# Patient Record
Sex: MALE | Race: WHITE | NOT HISPANIC OR LATINO | Employment: OTHER | URBAN - METROPOLITAN AREA
[De-identification: names, ages, dates, MRNs, and addresses within clinical notes are randomized per-mention and may not be internally consistent; named-entity substitution may affect disease eponyms.]

---

## 2017-01-12 ENCOUNTER — HOSPITAL ENCOUNTER (OUTPATIENT)
Dept: LAB | Facility: MEDICAL CENTER | Age: 37
End: 2017-01-12
Attending: NURSE PRACTITIONER
Payer: COMMERCIAL

## 2017-01-12 ENCOUNTER — TELEPHONE (OUTPATIENT)
Dept: MEDICAL GROUP | Facility: MEDICAL CENTER | Age: 37
End: 2017-01-12

## 2017-01-12 DIAGNOSIS — E55.9 VITAMIN D DEFICIENCY: ICD-10-CM

## 2017-01-12 DIAGNOSIS — Z13.220 SCREENING FOR HYPERLIPIDEMIA: ICD-10-CM

## 2017-01-12 DIAGNOSIS — R41.840 DIFFICULTY CONCENTRATING: ICD-10-CM

## 2017-01-12 DIAGNOSIS — R63.5 WEIGHT GAIN: ICD-10-CM

## 2017-01-12 LAB
25(OH)D3 SERPL-MCNC: 27 NG/ML (ref 30–100)
ALBUMIN SERPL BCP-MCNC: 4.2 G/DL (ref 3.2–4.9)
ALBUMIN/GLOB SERPL: 1.4 G/DL
ALP SERPL-CCNC: 49 U/L (ref 30–99)
ALT SERPL-CCNC: 61 U/L (ref 2–50)
ANION GAP SERPL CALC-SCNC: 8 MMOL/L (ref 0–11.9)
AST SERPL-CCNC: 48 U/L (ref 12–45)
BILIRUB SERPL-MCNC: 1 MG/DL (ref 0.1–1.5)
BUN SERPL-MCNC: 18 MG/DL (ref 8–22)
CALCIUM SERPL-MCNC: 9.2 MG/DL (ref 8.4–10.2)
CHLORIDE SERPL-SCNC: 103 MMOL/L (ref 96–112)
CHOLEST SERPL-MCNC: 175 MG/DL (ref 100–199)
CO2 SERPL-SCNC: 27 MMOL/L (ref 20–33)
CREAT SERPL-MCNC: 1.02 MG/DL (ref 0.5–1.4)
GFR SERPL CREATININE-BSD FRML MDRD: >60 ML/MIN/1.73 M 2
GLOBULIN SER CALC-MCNC: 3 G/DL (ref 1.9–3.5)
GLUCOSE SERPL-MCNC: 85 MG/DL (ref 65–99)
HDLC SERPL-MCNC: 40 MG/DL
LDLC SERPL CALC-MCNC: 127 MG/DL
POTASSIUM SERPL-SCNC: 4 MMOL/L (ref 3.6–5.5)
PROT SERPL-MCNC: 7.2 G/DL (ref 6–8.2)
SODIUM SERPL-SCNC: 138 MMOL/L (ref 135–145)
T4 FREE SERPL-MCNC: 1.09 NG/DL (ref 0.58–1.64)
TRIGL SERPL-MCNC: 42 MG/DL (ref 0–149)
TSH SERPL DL<=0.005 MIU/L-ACNC: 1.24 UIU/ML (ref 0.35–5.5)

## 2017-01-12 PROCEDURE — 84439 ASSAY OF FREE THYROXINE: CPT

## 2017-01-12 PROCEDURE — 82306 VITAMIN D 25 HYDROXY: CPT

## 2017-01-12 PROCEDURE — 80061 LIPID PANEL: CPT

## 2017-01-12 PROCEDURE — 84443 ASSAY THYROID STIM HORMONE: CPT

## 2017-01-12 PROCEDURE — 36415 COLL VENOUS BLD VENIPUNCTURE: CPT

## 2017-01-12 PROCEDURE — 80053 COMPREHEN METABOLIC PANEL: CPT

## 2017-01-12 NOTE — Clinical Note
January 12, 2017        Maik Lopez  [address] [place] [address]        Dear Maik:    After careful review of your chart, we have noted you are due for a follow up appointment.  We request you call our office at [phone] at your earliest convenience and make an appointment.     We look forward to scheduling an appointment for you, so that we may provide you with the safest and most complete medical care.        If you have any questions or concerns, please don't hesitate to call.        Sincerely,        CORINA EstevezPMAGDALENA.    Electronically Signed

## 2017-01-12 NOTE — TELEPHONE ENCOUNTER
----- Message from PANCHITO Estevez sent at 1/12/2017 10:42 AM PST -----  Please have pt set appointment to review and discuss treatment for labs.

## 2017-01-16 ENCOUNTER — TELEPHONE (OUTPATIENT)
Dept: MEDICAL GROUP | Facility: MEDICAL CENTER | Age: 37
End: 2017-01-16

## 2017-01-16 NOTE — TELEPHONE ENCOUNTER
----- Message from PANCHITO Estevez sent at 1/15/2017  3:52 PM PST -----  Please have pt set appointment to review and discuss treatment for labs.

## 2017-01-26 ENCOUNTER — OFFICE VISIT (OUTPATIENT)
Dept: MEDICAL GROUP | Facility: MEDICAL CENTER | Age: 37
End: 2017-01-26
Payer: COMMERCIAL

## 2017-01-26 VITALS
TEMPERATURE: 97.3 F | SYSTOLIC BLOOD PRESSURE: 110 MMHG | WEIGHT: 233 LBS | BODY MASS INDEX: 30.88 KG/M2 | HEIGHT: 73 IN | OXYGEN SATURATION: 96 % | HEART RATE: 90 BPM | DIASTOLIC BLOOD PRESSURE: 70 MMHG

## 2017-01-26 DIAGNOSIS — E55.9 VITAMIN D DEFICIENCY: ICD-10-CM

## 2017-01-26 DIAGNOSIS — K21.9 GASTROESOPHAGEAL REFLUX DISEASE WITHOUT ESOPHAGITIS: ICD-10-CM

## 2017-01-26 DIAGNOSIS — F32.89 OTHER DEPRESSION: ICD-10-CM

## 2017-01-26 DIAGNOSIS — F51.01 PRIMARY INSOMNIA: ICD-10-CM

## 2017-01-26 DIAGNOSIS — R79.89 ELEVATED LFTS: ICD-10-CM

## 2017-01-26 DIAGNOSIS — E66.9 OBESITY (BMI 30-39.9): ICD-10-CM

## 2017-01-26 DIAGNOSIS — B00.9 HSV INFECTION: ICD-10-CM

## 2017-01-26 PROCEDURE — 99214 OFFICE O/P EST MOD 30 MIN: CPT | Performed by: NURSE PRACTITIONER

## 2017-01-26 RX ORDER — TEMAZEPAM 15 MG/1
15 CAPSULE ORAL NIGHTLY PRN
Qty: 30 CAP | Refills: 0 | Status: SHIPPED | OUTPATIENT
Start: 2017-01-26 | End: 2017-03-15 | Stop reason: SDUPTHER

## 2017-01-26 RX ORDER — OMEPRAZOLE 20 MG/1
20 CAPSULE, DELAYED RELEASE ORAL DAILY
Qty: 30 CAP | Refills: 3 | Status: SHIPPED | OUTPATIENT
Start: 2017-01-26 | End: 2018-06-07

## 2017-01-26 RX ORDER — ACYCLOVIR 400 MG/1
400 TABLET ORAL 2 TIMES DAILY PRN
Qty: 30 TAB | Refills: 0 | Status: SHIPPED | OUTPATIENT
Start: 2017-01-26 | End: 2017-06-09 | Stop reason: SDUPTHER

## 2017-01-26 ASSESSMENT — PATIENT HEALTH QUESTIONNAIRE - PHQ9: CLINICAL INTERPRETATION OF PHQ2 SCORE: 2

## 2017-01-26 NOTE — MR AVS SNAPSHOT
"        Maik Lopez   2017 10:00 AM   Office Visit   MRN: [id number]    Department:  [facility]   Dept Phone:  [phone]    Description:  Male : 1980   Provider:  PANCHITO Estevez           Allergies as of 2017     Allergen Noted Reactions    Nkda [No Known Drug Allergy] 10/07/2010         You were diagnosed with     Other depression   [5784587]   consider med tx after lab in 1m onth if lft's are wnl    Obesity (BMI 30-39.9)   [263471]   improve low fat/chol diet and exercise.     Elevated LFTs   [601497]   recheck hfp in 1 month.  f/u for review    Gastroesophageal reflux disease without esophagitis   [321368]   restart prilosec.  check h pylori    Vitamin D deficiency   [3497961]   take 4000 units daily x 30 days then dec to 2000 units daily    HSV infection   [798382]       Primary insomnia   [371565]         Vital Signs     Blood Pressure Pulse Temperature Height Weight Body Mass Index    110/70 mmHg 90 36.3 °C (97.3 °F) 1.854 m (6' 1\") 105.688 kg (233 lb) 30.75 kg/m2    Oxygen Saturation Smoking Status                96% Never Smoker           Basic Information     Date Of Birth Sex Race Ethnicity Preferred Language    1980 Male White Non- English      Problem List              ICD-10-CM Priority Class Noted - Resolved    Snoring R06.83   Unknown - Present    Insomnia G47.00   2011 - Present    Preventative health care Z00.00   2011 - Present    ESOPHAGITIS    2011 - Present    HEMORRHOIDS    2011 - Present    Pharyngitis J02.9   2016 - Present    Obesity (BMI 30-39.9) E66.9   2017 - Present      Health Maintenance        Date Due Completion Dates    IMM INFLUENZA (1) 2016 ---    IMM DTaP/Tdap/Td Vaccine (2 - Td) 2019            Current Immunizations     Hepatitis B Vaccine Non-Recombivax (Ped/Adol) 2009    Tdap Vaccine 2009    Tuberculin Skin Test 2009  3:21 PM      Below and/or attached are the " medications your provider expects you to take. Review all of your home medications and newly ordered medications with your provider and/or pharmacist. Follow medication instructions as directed by your provider and/or pharmacist. Please keep your medication list with you and share with your provider. Update the information when medications are discontinued, doses are changed, or new medications (including over-the-counter products) are added; and carry medication information at all times in the event of emergency situations     Allergies:  NKDA - (reactions not documented)               Medications  Valid as of: January 26, 2017 - 11:18 AM    Generic Name Brand Name Tablet Size Instructions for use    Acyclovir (Tab) ZOVIRAX 400 MG Take 1 Tab by mouth 2 times a day as needed.        Omeprazole (CAPSULE DELAYED RELEASE) PRILOSEC 20 MG Take 1 Cap by mouth every day.        Temazepam (Cap) RESTORIL 15 MG Take 1 Cap by mouth at bedtime as needed for Sleep. TAKE ONE CAPSULE BY MOUTH AT BEDTIME AS NEEDED FOR SLEEP        .                 Medicines prescribed today were sent to:     [id number] - [place], [place] - [address]    [address] [place] [address]    Phone: [phone] Fax: [phone]    Open 24 Hours?: No      Medication refill instructions:       If your prescription bottle indicates you have medication refills left, it is not necessary to call your provider’s office. Please contact your pharmacy and they will refill your medication.    If your prescription bottle indicates you do not have any refills left, you may request refills at any time through one of the following ways: The online [employer] system (except Urgent Care), by calling your provider’s office, or by asking your pharmacy to contact your provider’s office with a refill request. Medication refills are processed only during regular business hours and may not be available until the next business day. Your provider may request  additional information or to have a follow-up visit with you prior to refilling your medication.   *Please Note: Medication refills are assigned a new Rx number when refilled electronically. Your pharmacy may indicate that no refills were authorized even though a new prescription for the same medication is available at the pharmacy. Please request the medicine by name with the pharmacy before contacting your provider for a refill.        Your To Do List     Future Labs/Procedures Complete By Expires    H. PYLORI, UREA BREATH TEST, ADULT  As directed 1/26/2018    HEPATIC FUNCTION PANEL  As directed 1/26/2018         [email or web address] Access Code: [email or web address]  Expires: 2/5/2017  1:51 PM    [email or web address][employer]com  A secure, online tool to manage your health information     [employer]’s EventBrowsr.com® is a secure, online tool that connects you to your personalized health information from the privacy of your home -- day or night - making it very easy for you to manage your healthcare. Once the activation process is completed, you can even access your medical information using the [email or web address].com chilo, which is available for free in the Apple Chilo store or Google Play store.     [email or web address]com provides the following levels of access (as shown below):   My Chart Features   [facility] Doctor [facility]  Urgent  Care [facility]  Primary Care  Doctor   Email your healthcare team securely and privately 24/7 X X X    Manage appointments: schedule your next appointment; view details of past/upcoming appointments X      Request prescription refills. X      View recent personal medical records, including lab and immunizations X X X X   View health record, including health history, allergies, medications X X X X   Read reports about your outpatient visits, procedures, consult and ER notes X X X X   See your discharge summary, which is a recap of your hospital and/or ER visit that includes your diagnosis, lab results, and care plan. X X       How  to register for [employer]:  1. Go to  [email or web address].  2. Click on the Sign Up Now box, which takes you to the New Member Sign Up page. You will need to provide the following information:  a. Enter your BoxCat Access Code exactly as it appears at the top of this page. (You will not need to use this code after you’ve completed the sign-up process. If you do not sign up before the expiration date, you must request a new code.)   b. Enter your date of birth.   c. Enter your home email address.   d. Click Submit, and follow the next screen’s instructions.  3. Create a BoxCat ID. This will be your BoxCat login ID and cannot be changed, so think of one that is secure and easy to remember.  4. Create a [employer] password. You can change your password at any time.  5. Enter your Password Reset Question and Answer. This can be used at a later time if you forget your password.   6. Enter your e-mail address. This allows you to receive e-mail notifications when new information is available in BoxCat.  7. Click Sign Up. You can now view your health information.    For assistance activating your BoxCat account, call [phone]

## 2017-01-26 NOTE — PROGRESS NOTES
Subjective:      Maik Lopez is a 37 y.o. male who presents with No chief complaint on file.            HPI  Seen in f/u for inablility to loose wt and depression. He has been trying to work out regularly.  He doesn't have any motivation.  Feeling depressed.  Stopped eating carbs.  Drinking water.    He has chronic insomnia.  Needs refills on restoril.  Uses only occas.  Still eating lots of fat and carbs.  Reviewed appt diet.   He is still feeling very fatigued.  Not getting better.    Also needs refill on acyclovir for occas outbreaks.   Extensively reviewed approp low fat/chol diet and exercise.  Reviewed lab with pt.  His CMP shows mildly elevated lft's.  Hasn't been drinking etoh or taking tyl  Vitamin d is 27.  Not on otc supplement.  LP shows trg and LDL IS at goal but LDL is high normal at 127.  Has a low HDL.    He is having more heartburin in the last week. Did have before and resolved with tx of prilosec.  TSH, T4, GFR is wnl.  His depression is effecting his activities.  All he does is sit on couch and watch tv.  Trying to go to the gym.   Not able to concentrate.  Has had this in the past with difficulty with concentrating.      Patient Active Problem List    Diagnosis Date Noted   • Obesity (BMI 30-39.9) 01/26/2017   • Pharyngitis 06/14/2016   • Insomnia 05/26/2011   • Preventative health care 05/26/2011   • ESOPHAGITIS 05/26/2011   • HEMORRHOIDS 05/26/2011   • Snoring      Current Outpatient Prescriptions   Medication Sig Dispense Refill   • temazepam (RESTORIL) 15 MG Cap TAKE ONE CAPSULE BY MOUTH AT BEDTIME AS NEEDED FOR SLEEP 30 Cap 0     No current facility-administered medications for this visit.     Allergies   Allergen Reactions   • Nkda [No Known Drug Allergy]            ROS    Review of Systems   Constitutional: Negative.  Negative for fever, chills, weight loss, malaise/fatigue and diaphoresis.   HENT: Negative.  Negative for hearing loss, ear pain, nosebleeds, congestion, sore throat,  "neck pain, tinnitus and ear discharge.    Respiratory: Negative.  Negative for cough, hemoptysis, sputum production, shortness of breath, wheezing and stridor.    Cardiovascular: Negative.  Negative for chest pain, palpitations, orthopnea, claudication, leg swelling and PND.   Gastrointestinal: denies nausea, vomiting, diarrhea, constipation,  melena or hematochezia.  Genitourinary: Denies dysuria, hematuria, urinary incontinence, frequency or urgency.          Objective:     /70 mmHg  Pulse 90  Temp(Src) 36.3 °C (97.3 °F)  Ht 1.854 m (6' 1\")  Wt 105.688 kg (233 lb)  BMI 30.75 kg/m2  SpO2 96%     Physical Exam      Physical Exam   Vitals reviewed.  Constitutional: oriented to person, place, and time. appears well-developed and well-nourished. No distress.   Cardiovascular: Normal rate, regular rhythm, normal heart sounds and intact distal pulses.  Exam reveals no gallop and no friction rub.  No murmur heard.  No carotid bruits.   Pulmonary/Chest: Effort normal and breath sounds normal. No stridor. No respiratory distress. no wheezes or rales. exhibits no tenderness.   Musculoskeletal: Normal range of motion. exhibits no edema. kita pedal pulses 2+.  Lymphadenopathy: no cervical or supraclavicular adenopathy.   Neurological: alert and oriented to person, place, and time. exhibits normal muscle tone. Coordination normal.   Skin: Skin is warm and dry. no diaphoresis.   Psychiatric: normal mood and affect. behavior is normal.            Assessment/Plan:     1. Other depression      consider med tx after lab in 1m onth if lft's are wnl   2. Obesity (BMI 30-39.9)      improve low fat/chol diet and exercise.    3. Elevated LFTs  HEPATIC FUNCTION PANEL    recheck hfp in 1 month.  f/u for review   4. Gastroesophageal reflux disease without esophagitis  H. PYLORI, UREA BREATH TEST, ADULT    omeprazole (PRILOSEC) 20 MG delayed-release capsule    restart prilosec.  check h pylori   5. Vitamin D deficiency      take " 4000 units daily x 30 days then dec to 2000 units daily   6. HSV infection  acyclovir (ZOVIRAX) 400 MG tablet   7. Primary insomnia  temazepam (RESTORIL) 15 MG Cap     8.  Do h  Pylori breath test today  9.  Recheck HFP in 1m onth  10.  F/u after lab in 1 month for review and sx eval and poss start med for bp

## 2017-03-16 RX ORDER — TEMAZEPAM 15 MG/1
CAPSULE ORAL
Qty: 30 CAP | Refills: 0 | Status: SHIPPED
Start: 2017-03-16 | End: 2017-06-09 | Stop reason: SDUPTHER

## 2017-06-09 DIAGNOSIS — E78.5 HYPERLIPIDEMIA LDL GOAL <100: ICD-10-CM

## 2017-06-09 DIAGNOSIS — F51.01 PRIMARY INSOMNIA: ICD-10-CM

## 2017-06-09 NOTE — Clinical Note
June 12, 2017        Maik Lopez  [address] [place] [address]        Dear Maik:    We have received a request from your pharmacy to refill your prescription(s). After careful review of your chart, we have noted you are labs and a follow up appointment.  We request you call our office at [phone] at your earliest convenience and make an appointment.     However, when patients are not followed closely by their physician, potential medication complications may go unadressed. We look forward to scheduling an appointment for you, so that we may provide you with the safest and most complete medical care.          If you have any questions or concerns, please don't hesitate to call.        Sincerely,        JERE Estevez.    Electronically Signed

## 2017-06-11 RX ORDER — ACYCLOVIR 400 MG/1
TABLET ORAL
Qty: 15 TAB | Refills: 0 | Status: SHIPPED | OUTPATIENT
Start: 2017-06-11 | End: 2018-06-07 | Stop reason: SDUPTHER

## 2017-06-12 RX ORDER — TEMAZEPAM 15 MG/1
CAPSULE ORAL
Qty: 15 CAP | Refills: 0 | Status: SHIPPED
Start: 2017-06-12 | End: 2018-06-07

## 2018-06-01 PROBLEM — F90.2 ATTENTION DEFICIT HYPERACTIVITY DISORDER (ADHD), COMBINED TYPE: Status: ACTIVE | Noted: 2018-06-01

## 2018-06-07 ENCOUNTER — TELEPHONE (OUTPATIENT)
Dept: MEDICAL GROUP | Facility: MEDICAL CENTER | Age: 38
End: 2018-06-07

## 2018-06-07 ENCOUNTER — HOSPITAL ENCOUNTER (OUTPATIENT)
Dept: RADIOLOGY | Facility: MEDICAL CENTER | Age: 38
End: 2018-06-07
Attending: NURSE PRACTITIONER
Payer: COMMERCIAL

## 2018-06-07 ENCOUNTER — HOSPITAL ENCOUNTER (OUTPATIENT)
Dept: LAB | Facility: MEDICAL CENTER | Age: 38
End: 2018-06-07
Attending: NURSE PRACTITIONER
Payer: COMMERCIAL

## 2018-06-07 ENCOUNTER — OFFICE VISIT (OUTPATIENT)
Dept: MEDICAL GROUP | Facility: MEDICAL CENTER | Age: 38
End: 2018-06-07
Payer: COMMERCIAL

## 2018-06-07 VITALS
HEART RATE: 81 BPM | BODY MASS INDEX: 30.88 KG/M2 | DIASTOLIC BLOOD PRESSURE: 80 MMHG | TEMPERATURE: 97.5 F | OXYGEN SATURATION: 94 % | SYSTOLIC BLOOD PRESSURE: 110 MMHG | WEIGHT: 233 LBS | HEIGHT: 73 IN

## 2018-06-07 DIAGNOSIS — F51.01 PRIMARY INSOMNIA: ICD-10-CM

## 2018-06-07 DIAGNOSIS — R73.9 HYPERGLYCEMIA: ICD-10-CM

## 2018-06-07 DIAGNOSIS — R53.83 OTHER FATIGUE: ICD-10-CM

## 2018-06-07 DIAGNOSIS — M25.512 ACUTE PAIN OF LEFT SHOULDER: ICD-10-CM

## 2018-06-07 DIAGNOSIS — M77.01 MEDIAL EPICONDYLITIS OF RIGHT ELBOW: ICD-10-CM

## 2018-06-07 DIAGNOSIS — Z00.00 ANNUAL PHYSICAL EXAM: ICD-10-CM

## 2018-06-07 DIAGNOSIS — Z13.220 SCREENING FOR HYPERLIPIDEMIA: ICD-10-CM

## 2018-06-07 DIAGNOSIS — B00.1 COLD SORE: ICD-10-CM

## 2018-06-07 LAB
25(OH)D3 SERPL-MCNC: 34 NG/ML (ref 30–100)
ALBUMIN SERPL BCP-MCNC: 4.1 G/DL (ref 3.2–4.9)
ALBUMIN/GLOB SERPL: 1.4 G/DL
ALP SERPL-CCNC: 63 U/L (ref 30–99)
ALT SERPL-CCNC: 32 U/L (ref 2–50)
ANION GAP SERPL CALC-SCNC: 7 MMOL/L (ref 0–11.9)
AST SERPL-CCNC: 21 U/L (ref 12–45)
BASOPHILS # BLD AUTO: 0.5 % (ref 0–1.8)
BASOPHILS # BLD: 0.03 K/UL (ref 0–0.12)
BILIRUB SERPL-MCNC: 0.6 MG/DL (ref 0.1–1.5)
BUN SERPL-MCNC: 14 MG/DL (ref 8–22)
CALCIUM SERPL-MCNC: 9.5 MG/DL (ref 8.5–10.5)
CHLORIDE SERPL-SCNC: 107 MMOL/L (ref 96–112)
CHOLEST SERPL-MCNC: 198 MG/DL (ref 100–199)
CO2 SERPL-SCNC: 27 MMOL/L (ref 20–33)
CREAT SERPL-MCNC: 0.92 MG/DL (ref 0.5–1.4)
EOSINOPHIL # BLD AUTO: 0.08 K/UL (ref 0–0.51)
EOSINOPHIL NFR BLD: 1.3 % (ref 0–6.9)
ERYTHROCYTE [DISTWIDTH] IN BLOOD BY AUTOMATED COUNT: 40.2 FL (ref 35.9–50)
FOLATE SERPL-MCNC: 12.6 NG/ML
GLOBULIN SER CALC-MCNC: 2.9 G/DL (ref 1.9–3.5)
GLUCOSE SERPL-MCNC: 104 MG/DL (ref 65–99)
HCT VFR BLD AUTO: 47.9 % (ref 42–52)
HDLC SERPL-MCNC: 41 MG/DL
HGB BLD-MCNC: 15.9 G/DL (ref 14–18)
IMM GRANULOCYTES # BLD AUTO: 0.01 K/UL (ref 0–0.11)
IMM GRANULOCYTES NFR BLD AUTO: 0.2 % (ref 0–0.9)
LDLC SERPL CALC-MCNC: 141 MG/DL
LYMPHOCYTES # BLD AUTO: 1.48 K/UL (ref 1–4.8)
LYMPHOCYTES NFR BLD: 24.9 % (ref 22–41)
MCH RBC QN AUTO: 30.1 PG (ref 27–33)
MCHC RBC AUTO-ENTMCNC: 33.2 G/DL (ref 33.7–35.3)
MCV RBC AUTO: 90.7 FL (ref 81.4–97.8)
MONOCYTES # BLD AUTO: 0.51 K/UL (ref 0–0.85)
MONOCYTES NFR BLD AUTO: 8.6 % (ref 0–13.4)
NEUTROPHILS # BLD AUTO: 3.83 K/UL (ref 1.82–7.42)
NEUTROPHILS NFR BLD: 64.5 % (ref 44–72)
NRBC # BLD AUTO: 0 K/UL
NRBC BLD-RTO: 0 /100 WBC
PLATELET # BLD AUTO: 274 K/UL (ref 164–446)
PMV BLD AUTO: 9.8 FL (ref 9–12.9)
POTASSIUM SERPL-SCNC: 4.2 MMOL/L (ref 3.6–5.5)
PROT SERPL-MCNC: 7 G/DL (ref 6–8.2)
RBC # BLD AUTO: 5.28 M/UL (ref 4.7–6.1)
SODIUM SERPL-SCNC: 141 MMOL/L (ref 135–145)
T4 FREE SERPL-MCNC: 0.69 NG/DL (ref 0.53–1.43)
THYROPEROXIDASE AB SERPL-ACNC: <0.2 IU/ML (ref 0–9)
TRIGL SERPL-MCNC: 78 MG/DL (ref 0–149)
TSH SERPL DL<=0.005 MIU/L-ACNC: 1.29 UIU/ML (ref 0.38–5.33)
VIT B12 SERPL-MCNC: 399 PG/ML (ref 211–911)
WBC # BLD AUTO: 5.9 K/UL (ref 4.8–10.8)

## 2018-06-07 PROCEDURE — 82746 ASSAY OF FOLIC ACID SERUM: CPT

## 2018-06-07 PROCEDURE — 80061 LIPID PANEL: CPT

## 2018-06-07 PROCEDURE — 84439 ASSAY OF FREE THYROXINE: CPT

## 2018-06-07 PROCEDURE — 84443 ASSAY THYROID STIM HORMONE: CPT

## 2018-06-07 PROCEDURE — 99395 PREV VISIT EST AGE 18-39: CPT | Performed by: NURSE PRACTITIONER

## 2018-06-07 PROCEDURE — 86376 MICROSOMAL ANTIBODY EACH: CPT

## 2018-06-07 PROCEDURE — 82607 VITAMIN B-12: CPT

## 2018-06-07 PROCEDURE — 36415 COLL VENOUS BLD VENIPUNCTURE: CPT

## 2018-06-07 PROCEDURE — 82306 VITAMIN D 25 HYDROXY: CPT

## 2018-06-07 PROCEDURE — 73030 X-RAY EXAM OF SHOULDER: CPT | Mod: LT

## 2018-06-07 PROCEDURE — 80053 COMPREHEN METABOLIC PANEL: CPT

## 2018-06-07 PROCEDURE — 85025 COMPLETE CBC W/AUTO DIFF WBC: CPT

## 2018-06-07 RX ORDER — DICLOFENAC SODIUM 75 MG/1
75 TABLET, DELAYED RELEASE ORAL 2 TIMES DAILY
Qty: 30 TAB | Refills: 0 | Status: SHIPPED | OUTPATIENT
Start: 2018-06-07 | End: 2020-10-21

## 2018-06-07 RX ORDER — TRAZODONE HYDROCHLORIDE 50 MG/1
50-100 TABLET ORAL NIGHTLY PRN
Qty: 45 TAB | Refills: 3 | Status: SHIPPED | OUTPATIENT
Start: 2018-06-07 | End: 2020-10-21

## 2018-06-07 RX ORDER — ACYCLOVIR 400 MG/1
400 TABLET ORAL 2 TIMES DAILY PRN
Qty: 15 TAB | Refills: 3 | Status: SHIPPED | OUTPATIENT
Start: 2018-06-07 | End: 2020-10-21 | Stop reason: SDUPTHER

## 2018-06-07 ASSESSMENT — PATIENT HEALTH QUESTIONNAIRE - PHQ9: CLINICAL INTERPRETATION OF PHQ2 SCORE: 0

## 2018-06-07 NOTE — PROGRESS NOTES
Subjective:     Maik Lopez is a 38 y.o. male who presents with PE.    HPI:   Seen in f/u for PE.  He is extremely fatigued.  He is not sleeping well.  He is taking otc d and c.  Not helping.  This has been going on for several weeks.    He will wake up suddenly with feeling anxiety.  Will feel like he swallowed something.  Will feel that he has some pressure on his throat.  A ct neck in 2011 was wnl.    He is due preventative.    He tried trazodone in past and it helped.  Took it last nite and it didn't.   He has rt medial epicondylitis.  He has a brace wearing for a month.  Not helping.  His pain started about 6 months ago.  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  That causes pain.    He is having left shoulder pain.  When he goes to the gym he has pain with barbell.  Free wts is ok.  Will have pain with pushing.     He needs refill on acyclovir.    Patient Active Problem List    Diagnosis Date Noted   • Attention deficit hyperactivity disorder (ADHD), combined type 06/01/2018   • Obesity (BMI 30-39.9) 01/26/2017   • Insomnia 05/26/2011   • Preventative health care 05/26/2011   • ESOPHAGITIS 05/26/2011   • HEMORRHOIDS 05/26/2011   • Snoring        Current medicines (including changes today)  Current Outpatient Prescriptions   Medication Sig Dispense Refill   • acyclovir (ZOVIRAX) 400 MG tablet Take 1 Tab by mouth 2 times a day as needed. 15 Tab 3   • traZODone (DESYREL) 50 MG Tab Take 1-2 Tabs by mouth at bedtime as needed for Sleep. 45 Tab 3   • diclofenac EC (VOLTAREN) 75 MG Tablet Delayed Response Take 1 Tab by mouth 2 times a day. 30 Tab 0     No current facility-administered medications for this visit.        Allergies   Allergen Reactions   • Nkda [No Known Drug Allergy]        ROS  Review of Systems   Constitutional: Negative.  Negative for fever, chills, weight loss, malaise/fatigue and diaphoresis.   HENT: Negative.  Negative for hearing loss, ear pain, nosebleeds,  "congestion, sore throat, neck pain, tinnitus and ear discharge.    Eyes: Negative.  Negative for blurred vision, double vision, photophobia, pain, discharge and redness.   Respiratory: Negative.  Negative for cough, hemoptysis, sputum production, shortness of breath, wheezing and stridor.    Cardiovascular: Negative.  Negative for chest pain, palpitations, orthopnea, claudication, leg swelling and PND.   Gastrointestinal: Negative.  Negative for heartburn, nausea, vomiting, abdominal pain, diarrhea, constipation, blood in stool and melena.   Genitourinary: Negative.  Negative for dysuria, urgency, frequency, incontinence, hematuria and flank pain.   Musculoskeletal: Negative.  Negative for myalgias, back pain and falls.   Skin: Negative.  Negative for itching and rash. followed by derm.   Neurological: Negative.  Negative for dizziness, tingling, tremors, sensory change, speech change, focal weakness, seizures, loss of consciousness, weakness and headaches.   Endo/Heme/Allergies: Negative.  Negative for environmental allergies and polydipsia. Does not bruise/bleed easily.   Psychiatric/Behavioral: Negative.  Negative for depression, suicidal ideas, hallucinations, memory loss and substance abuse. The patient is not nervous/anxious.    All other systems reviewed and are negative.           Objective:     Blood pressure 110/80, pulse 81, temperature 36.4 °C (97.5 °F), height 1.854 m (6' 1\"), weight 105.7 kg (233 lb), SpO2 94 %. Body mass index is 30.74 kg/m².    Physical Exam:  Physical Exam   Vitals reviewed.  Constitutional: oriented to person, place, and time. appears well-developed and well-nourished. No distress.   HENT: Head: Normocephalic and atraumatic. Bilateral tympanic membranes wnl w/o bulging.  Right Ear: External ear normal. Left Ear: External ear normal. Nose: Nose normal.  Mouth/Throat: Oropharynx is clear and moist. No oropharyngeal exudate. kita tm wnl. Eyes: Conjunctivae and EOM are normal. Pupils " are equal, round, and reactive to light. Right eye exhibits no discharge. Left eye exhibits no discharge. No scleral icterus.    Neck: Normal range of motion. Neck supple. No JVD present.   Cardiovascular: Normal rate, regular rhythm, normal heart sounds and intact distal pulses.  Exam reveals no gallop and no friction rub.  No murmur heard.  No carotid bruits   Pulmonary/Chest: Effort normal and breath sounds normal. No stridor. No respiratory distress. no wheezes or rales. exhibits no tenderness.   Abdominal: Soft. Bowel sounds are normal. exhibits no distension and no mass. No tenderness. no rebound and no guarding.   Musculoskeletal: pain left shoulder mild with range of motion. exhibits no edema or tenderness.  kita pedal pulses 2+.  Rt medial elbow with pain with rom adduction  Lymphadenopathy:  no cervical or supraclavicular adenopathy.   Neurological: alert and oriented to person, place, and time. has normal reflexes. displays normal reflexes. No cranial nerve deficit. exhibits normal muscle tone. Coordination normal.   Skin: Skin is warm and dry. No rash noted. no diaphoresis. No erythema. No pallor.   Psychiatric: normal mood and affect. behavior is normal.        Assessment and Plan:     The following treatment plan was discussed:    1. Annual physical exam     2. Primary insomnia  REFERRAL TO SLEEP STUDIES    normal OPO x2.  refer sleep clinic for continue issues of suddenly waking up   3. Other fatigue  CBC WITH DIFFERENTIAL    CMP14+LP    TSH+FREE T4    VITAMIN D 25-HYDROXY    THYROID PEROXIDASE  (TPO) AB    VIT B12,  FOLIC ACID    traZODone (DESYREL) 50 MG Tab    poss d/t insomnia.  check lab.  f/u 1 month for review.  try trazodone   4. Medial epicondylitis of right elbow  REFERRAL TO PHYSICAL THERAPY Reason for Therapy: Eval/Treat/Report    diclofenac EC (VOLTAREN) 75 MG Tablet Delayed Response    continue brace.  refer PT.  consider ortho referral if not improving.  try voltaren x 14 days   5. Acute  pain of left shoulder  REFERRAL TO PHYSICAL THERAPY Reason for Therapy: Eval/Treat/Report    DX-SHOULDER 2+ LEFT    diclofenac EC (VOLTAREN) 75 MG Tablet Delayed Response    xray left shoulder.  f/u with pt wiht results.  refer PT.  consider MRI if not improving. use voltaren x 14 days   6. Cold sore  acyclovir (ZOVIRAX) 400 MG tablet    refill acyclovir.  uses occas   7. Screening for hyperlipidemia  CMP14+LP         Followup: Return in about 4 weeks (around 7/5/2018).

## 2018-06-07 NOTE — TELEPHONE ENCOUNTER
Will review test results with pt at upcoming appt.  His glucosei s elevated so have him do a1c before appt

## 2018-06-12 ENCOUNTER — TELEPHONE (OUTPATIENT)
Dept: MEDICAL GROUP | Facility: MEDICAL CENTER | Age: 38
End: 2018-06-12

## 2018-06-12 NOTE — TELEPHONE ENCOUNTER
Pt is asking for you to release the labs on [email or web address]. Pt is also wondering why he is so exhausted  All the time. Pt is asking if he is supposed to do sleep study?

## 2018-06-13 NOTE — TELEPHONE ENCOUNTER
i referred him to sleep clinic.  Doesn't need home study.  Please give him referral info.  That is prob why he is exhausted.

## 2018-07-05 ENCOUNTER — OFFICE VISIT (OUTPATIENT)
Dept: MEDICAL GROUP | Facility: MEDICAL CENTER | Age: 38
End: 2018-07-05
Payer: COMMERCIAL

## 2018-07-05 VITALS
OXYGEN SATURATION: 95 % | HEART RATE: 72 BPM | HEIGHT: 73 IN | SYSTOLIC BLOOD PRESSURE: 112 MMHG | DIASTOLIC BLOOD PRESSURE: 86 MMHG | WEIGHT: 229 LBS | TEMPERATURE: 97.6 F | BODY MASS INDEX: 30.35 KG/M2

## 2018-07-05 DIAGNOSIS — F51.01 PRIMARY INSOMNIA: ICD-10-CM

## 2018-07-05 DIAGNOSIS — R73.9 HYPERGLYCEMIA: ICD-10-CM

## 2018-07-05 DIAGNOSIS — R53.82 CHRONIC FATIGUE: ICD-10-CM

## 2018-07-05 DIAGNOSIS — E78.5 HYPERLIPIDEMIA LDL GOAL <130: ICD-10-CM

## 2018-07-05 LAB
HBA1C MFR BLD: 5.5 % (ref ?–5.8)
INT CON NEG: NEGATIVE
INT CON NEG: NEGATIVE
INT CON POS: POSITIVE
INT CON POS: POSITIVE
S PYO AG THROAT QL: NORMAL

## 2018-07-05 PROCEDURE — 83036 HEMOGLOBIN GLYCOSYLATED A1C: CPT | Performed by: NURSE PRACTITIONER

## 2018-07-05 PROCEDURE — 99214 OFFICE O/P EST MOD 30 MIN: CPT | Performed by: NURSE PRACTITIONER

## 2018-07-05 PROCEDURE — 87880 STREP A ASSAY W/OPTIC: CPT | Performed by: NURSE PRACTITIONER

## 2018-07-05 RX ORDER — AMITRIPTYLINE HYDROCHLORIDE 10 MG/1
10 TABLET, FILM COATED ORAL NIGHTLY PRN
Qty: 30 TAB | Refills: 1 | Status: SHIPPED | OUTPATIENT
Start: 2018-07-05 | End: 2020-10-21

## 2018-07-05 NOTE — PROGRESS NOTES
Subjective:     Maik Lopez is a 38 y.o. male who presents with insomnia.    HPI:   Seen in f/u for insomnia.  He has been using trazodone for sleep. O nly working sometimes. grogging in am after taking.  Didn't sleep last nite even if taking trazodone.  He had 2 sleep studies in the past that were not conclusive.  Doesn't feel well.  Fatigued all the time.  No energy.  Irritable.    He has a referral to sleep clinic.  Will call today.    Reviewed lab with pt.  His GFR, TPO, FOLATE, B12, D, T4, TSH, CBC is wnl.  CMP is wnl except glucose mildly elevated at 104.  a1c in office today is 5.4.  LP shows good trg. HDL sl low at 41.  Hasn't been exercising recently d/t his lack of energy.  LDL is up from 127 to 141.  Not on healthy diet.  Goal is <130.   Started havign sore throat yesterday.  No fever, chills or sweating.  No headache, sinus pressure or ear pressure.  No nasal dg.  No SOB or wheezing.         Patient Active Problem List    Diagnosis Date Noted   • Attention deficit hyperactivity disorder (ADHD), combined type 06/01/2018   • Obesity (BMI 30-39.9) 01/26/2017   • Insomnia 05/26/2011   • Preventative health care 05/26/2011   • ESOPHAGITIS 05/26/2011   • HEMORRHOIDS 05/26/2011   • Snoring        Current medicines (including changes today)  Current Outpatient Prescriptions   Medication Sig Dispense Refill   • amitriptyline (ELAVIL) 10 MG Tab Take 1 Tab by mouth at bedtime as needed. 30 Tab 1   • acyclovir (ZOVIRAX) 400 MG tablet Take 1 Tab by mouth 2 times a day as needed. 15 Tab 3   • traZODone (DESYREL) 50 MG Tab Take 1-2 Tabs by mouth at bedtime as needed for Sleep. 45 Tab 3   • diclofenac EC (VOLTAREN) 75 MG Tablet Delayed Response Take 1 Tab by mouth 2 times a day. 30 Tab 0     No current facility-administered medications for this visit.        Allergies   Allergen Reactions   • Nkda [No Known Drug Allergy]        ROS  Constitutional: Negative. Negative for fever, chills, weight loss and  "diaphoresis.   HENT: Negative. Negative for hearing loss, ear pain, nosebleeds, congestion,  neck pain, tinnitus and ear discharge.   Respiratory: Negative. Negative for cough, hemoptysis, sputum production, shortness of breath, wheezing and stridor.   Cardiovascular: Negative. Negative for chest pain, palpitations, orthopnea, claudication, leg swelling and PND.   Gastrointestinal: Denies nausea, vomiting, diarrhea, constipation, heartburn, melena or hematochezia.  Genitourinary: Denies dysuria, hematuria, urinary incontinence, frequency or urgency.        Objective:     Blood pressure 112/86, pulse 72, temperature 36.4 °C (97.6 °F), height 1.854 m (6' 1\"), weight 103.9 kg (229 lb), SpO2 95 %. Body mass index is 30.21 kg/m².    Physical Exam:  Physical Exam   Vitals reviewed.  Constitutional: oriented to person, place, and time. appears well-developed and well-nourished. No distress.   HENT:  Head: Normocephalic and atraumatic. Right Ear: External ear normal. Left Ear: External ear normal. Nose: Nose normal. Mouth/Throat: Oropharynx is clear and moist. No oropharyngeal exudate.  kita tm wnl.  Eyes: Right eye exhibits no discharge. Left eye exhibits no discharge. No scleral icterus.  Neck: No JVD present.  Cardiovascular: Normal rate, regular rhythm, normal heart sounds and intact distal pulses.  Exam reveals no gallop and no friction rub.  No murmur heard.  No carotid bruits.   Pulmonary/Chest: Effort normal and breath sounds normal. No stridor. No respiratory distress. no wheezes or rales. exhibits no tenderness.   Musculoskeletal: Normal range of motion. exhibits no edema. kita pedal pulses 2+.  Lymphadenopathy: no cervical or supraclavicular adenopathy. tender to palp left neck but no mass or lymph node palp.    Neurological: alert and oriented to person, place, and time. exhibits normal muscle tone. Coordination normal.   Skin: Skin is warm and dry. no diaphoresis.   Psychiatric: normal mood and affect. behavior " is normal.        Assessment and Plan:     The following treatment plan was discussed:    1. Primary insomnia  amitriptyline (ELAVIL) 10 MG Tab    wean off trazodone.  try elavil.  start at 10 mg.  inc if needed.  f/u yearly and if sleep study neg.  will call for sleep appt today.     2. Chronic fatigue  amitriptyline (ELAVIL) 10 MG Tab    f/u with sleep clinic   3. Hyperglycemia      a1c is wnl.  improve healthy diet and exercise   4. Hyperlipidemia LDL goal <130      improve helathy diet and exercise.    5. BMI 30.0-30.9,adult  Patient identified as having weight management issue.  Appropriate orders and counseling given.         Followup: Return in about 1 year (around 7/5/2019).

## 2018-08-25 ENCOUNTER — HOSPITAL ENCOUNTER (OUTPATIENT)
Dept: LAB | Facility: MEDICAL CENTER | Age: 38
End: 2018-08-25
Attending: OPHTHALMOLOGY
Payer: COMMERCIAL

## 2018-08-25 LAB
ANION GAP SERPL CALC-SCNC: 6 MMOL/L (ref 0–11.9)
BASOPHILS # BLD AUTO: 0.6 % (ref 0–1.8)
BASOPHILS # BLD: 0.03 K/UL (ref 0–0.12)
BUN SERPL-MCNC: 17 MG/DL (ref 8–22)
CALCIUM SERPL-MCNC: 9.5 MG/DL (ref 8.5–10.5)
CHLORIDE SERPL-SCNC: 107 MMOL/L (ref 96–112)
CO2 SERPL-SCNC: 27 MMOL/L (ref 20–33)
CREAT SERPL-MCNC: 1.13 MG/DL (ref 0.5–1.4)
EOSINOPHIL # BLD AUTO: 0.07 K/UL (ref 0–0.51)
EOSINOPHIL NFR BLD: 1.4 % (ref 0–6.9)
ERYTHROCYTE [DISTWIDTH] IN BLOOD BY AUTOMATED COUNT: 40.7 FL (ref 35.9–50)
GLUCOSE SERPL-MCNC: 99 MG/DL (ref 65–99)
HCT VFR BLD AUTO: 47.1 % (ref 42–52)
HGB BLD-MCNC: 16.2 G/DL (ref 14–18)
IMM GRANULOCYTES # BLD AUTO: 0.01 K/UL (ref 0–0.11)
IMM GRANULOCYTES NFR BLD AUTO: 0.2 % (ref 0–0.9)
LYMPHOCYTES # BLD AUTO: 1.45 K/UL (ref 1–4.8)
LYMPHOCYTES NFR BLD: 28.3 % (ref 22–41)
MCH RBC QN AUTO: 31.5 PG (ref 27–33)
MCHC RBC AUTO-ENTMCNC: 34.4 G/DL (ref 33.7–35.3)
MCV RBC AUTO: 91.6 FL (ref 81.4–97.8)
MONOCYTES # BLD AUTO: 0.56 K/UL (ref 0–0.85)
MONOCYTES NFR BLD AUTO: 10.9 % (ref 0–13.4)
NEUTROPHILS # BLD AUTO: 3.01 K/UL (ref 1.82–7.42)
NEUTROPHILS NFR BLD: 58.6 % (ref 44–72)
NRBC # BLD AUTO: 0 K/UL
NRBC BLD-RTO: 0 /100 WBC
PLATELET # BLD AUTO: 246 K/UL (ref 164–446)
PMV BLD AUTO: 10.5 FL (ref 9–12.9)
POTASSIUM SERPL-SCNC: 4.3 MMOL/L (ref 3.6–5.5)
RBC # BLD AUTO: 5.14 M/UL (ref 4.7–6.1)
SODIUM SERPL-SCNC: 140 MMOL/L (ref 135–145)
WBC # BLD AUTO: 5.1 K/UL (ref 4.8–10.8)

## 2018-08-25 PROCEDURE — 80048 BASIC METABOLIC PNL TOTAL CA: CPT

## 2018-08-25 PROCEDURE — 85025 COMPLETE CBC W/AUTO DIFF WBC: CPT

## 2018-08-25 PROCEDURE — 36415 COLL VENOUS BLD VENIPUNCTURE: CPT

## 2018-09-26 ENCOUNTER — TELEPHONE (OUTPATIENT)
Dept: MEDICAL GROUP | Facility: MEDICAL CENTER | Age: 38
End: 2018-09-26

## 2018-09-26 NOTE — TELEPHONE ENCOUNTER
Pt called stated he is needing a letter explaining why he takes trazodone. Pt states he uses it for sleep not depression. Pt needs this for his insurance

## 2018-09-26 NOTE — LETTER
September 26, 2018        Patient: Maik Lopez   YOB: 1980   Date of Visit: 9/26/2018           To Whom It May Concern:    t is my medical opinion that Maik Lopez is receiving the medication, Trazodone, to aid in sleeping.  He has not been diagnosed with depression.  He was started on this medication on June 6, 2018.  He is stable on medication without side effects.    If you have any questions or concerns, please don't hesitate to call.        Sincerely,          JERE Stockton.  Electronically Signed    [facility]  [address]  [facility] [id number] [phone] (Phone)  [phone] (Fax)

## 2018-09-26 NOTE — TELEPHONE ENCOUNTER
i doubt that there is a med that they will not be concerned about since he is on it for sleep.  They all make you drowsy.  That is what they do.

## 2018-09-26 NOTE — TELEPHONE ENCOUNTER
Ryan Cummins can you put how long pt has been on the medication and he is also asking if there is an alternative medication that the [employer] wouldn't be concerned about.

## 2018-10-02 ENCOUNTER — TELEPHONE (OUTPATIENT)
Dept: MEDICAL GROUP | Facility: MEDICAL CENTER | Age: 38
End: 2018-10-02

## 2018-10-02 NOTE — LETTER
October 2, 2018        Patient: Maik Lopez   YOB: 1980   Date of Visit: 10/2/2018           To Whom It May Concern:    Maik Lopez is a patient followed long term in my clinic.  He has stopped trazodone on September 26, 2018.  He was on this medication for sleeping at night only.  He has not been diagnosed with sleep apnea.  An apnea link has been performed in the past that was entirely normal.    If you have any questions or concerns, please don't hesitate to call.        Sincerely,          JERE Stockton.  Electronically Signed    [facility]  [address]  [facility] [id number] [phone] (Phone)  [phone] (Fax)

## 2018-10-03 NOTE — TELEPHONE ENCOUNTER
Pt called asking if he can get a letter stating he stopped the trazodone on  9-. If pt was not diagnosed with sleep apnea that needs to be stated in the letter. And can you reiterate that pt was using the medication strictly for sleep at night time and not depression please.

## 2018-10-05 DIAGNOSIS — R53.83 OTHER FATIGUE: ICD-10-CM

## 2018-10-07 RX ORDER — TRAZODONE HYDROCHLORIDE 50 MG/1
50-100 TABLET ORAL NIGHTLY PRN
Qty: 45 TAB | OUTPATIENT
Start: 2018-10-07

## 2018-10-18 ENCOUNTER — APPOINTMENT (OUTPATIENT)
Dept: SLEEP MEDICINE | Facility: MEDICAL CENTER | Age: 38
End: 2018-10-18
Payer: COMMERCIAL

## 2020-02-03 DIAGNOSIS — B00.1 COLD SORE: ICD-10-CM

## 2020-02-03 RX ORDER — ACYCLOVIR 400 MG/1
400 TABLET ORAL 2 TIMES DAILY PRN
Qty: 15 TAB | Refills: 3 | OUTPATIENT
Start: 2020-02-03

## 2020-02-03 NOTE — LETTER
February 4, 2020        Maik Lopez  [address] [place] [address]        Dear Maik:    We have received a request from your pharmacy to refill your prescription(s). After careful review of your chart, we have noted you are due for a follow up appointment.  We request you call our office at [phone] at your earliest convenience and make an appointment.     We look forward to scheduling an appointment for you, so that we may provide you with the safest and most complete medical care.        If you have any questions or concerns, please don't hesitate to call.        Sincerely,        JERE Stockton.    Electronically Signed

## 2020-10-21 ENCOUNTER — OFFICE VISIT (OUTPATIENT)
Dept: MEDICAL GROUP | Facility: MEDICAL CENTER | Age: 40
End: 2020-10-21
Payer: COMMERCIAL

## 2020-10-21 VITALS
TEMPERATURE: 97.7 F | BODY MASS INDEX: 30.87 KG/M2 | DIASTOLIC BLOOD PRESSURE: 88 MMHG | WEIGHT: 234 LBS | HEART RATE: 84 BPM | SYSTOLIC BLOOD PRESSURE: 128 MMHG | OXYGEN SATURATION: 95 %

## 2020-10-21 DIAGNOSIS — Z13.220 SCREENING FOR HYPERLIPIDEMIA: ICD-10-CM

## 2020-10-21 DIAGNOSIS — Z13.29 SCREENING FOR THYROID DISORDER: ICD-10-CM

## 2020-10-21 DIAGNOSIS — L65.9 ALOPECIA: ICD-10-CM

## 2020-10-21 DIAGNOSIS — Z13.21 ENCOUNTER FOR VITAMIN DEFICIENCY SCREENING: ICD-10-CM

## 2020-10-21 DIAGNOSIS — R68.82 DECREASED LIBIDO: ICD-10-CM

## 2020-10-21 DIAGNOSIS — B00.1 COLD SORE: ICD-10-CM

## 2020-10-21 DIAGNOSIS — R53.83 FATIGUE, UNSPECIFIED TYPE: ICD-10-CM

## 2020-10-21 DIAGNOSIS — Z23 NEED FOR TDAP VACCINATION: ICD-10-CM

## 2020-10-21 DIAGNOSIS — Z13.89 SCREENING FOR MULTIPLE CONDITIONS: ICD-10-CM

## 2020-10-21 PROCEDURE — 90471 IMMUNIZATION ADMIN: CPT | Performed by: NURSE PRACTITIONER

## 2020-10-21 PROCEDURE — 90715 TDAP VACCINE 7 YRS/> IM: CPT | Performed by: NURSE PRACTITIONER

## 2020-10-21 PROCEDURE — 99214 OFFICE O/P EST MOD 30 MIN: CPT | Mod: 25 | Performed by: NURSE PRACTITIONER

## 2020-10-21 RX ORDER — ACYCLOVIR 400 MG/1
400 TABLET ORAL 2 TIMES DAILY PRN
Qty: 15 TAB | Refills: 3 | Status: SHIPPED | OUTPATIENT
Start: 2020-10-21 | End: 2021-05-17 | Stop reason: SDUPTHER

## 2020-10-21 ASSESSMENT — PATIENT HEALTH QUESTIONNAIRE - PHQ9: CLINICAL INTERPRETATION OF PHQ2 SCORE: 0

## 2020-10-21 NOTE — PROGRESS NOTES
Subjective:     Maik Lopez is a 40 y.o. male who presents with olecraneon bursitis.    HPI:   Seen in f/u for olecraneon bursitis.  Just started 1 wk ago.  Not painful.  Tender with pressure.  No reddness.  No limitation in ROS.  No fever, chills or sweating.  He is due updated lab.    He is due Tdap.   He is having hair loss.  Hair is getting thinner.   He is also fatigued all the time with decreased libido.  He is using energy drinks for his fatigue.  He is still having difficulty sleeping.  He is having dreams that he swallowed something but he will wake up and will be fine.  Sleep apnea x2 was wnl.  He has poor sleep quality long term  He is on acyclovir for HSV infection.  Uses only prn.  Needs refills.      Patient Active Problem List    Diagnosis Date Noted   • Attention deficit hyperactivity disorder (ADHD), combined type 06/01/2018   • Obesity (BMI 30-39.9) 01/26/2017   • Insomnia 05/26/2011   • Preventative health care 05/26/2011   • ESOPHAGITIS 05/26/2011   • HEMORRHOIDS 05/26/2011   • Snoring        Current medicines (including changes today)  Current Outpatient Medications   Medication Sig Dispense Refill   • acyclovir (ZOVIRAX) 400 MG tablet Take 1 Tab by mouth 2 times a day as needed. 15 Tab 3     No current facility-administered medications for this visit.        Allergies   Allergen Reactions   • Nkda [No Known Drug Allergy]        ROS  Constitutional: Negative. Negative for fever, chills, weight loss, diaphoresis.   HENT: Negative. Negative for hearing loss, ear pain, nosebleeds, congestion, sore throat, neck pain, tinnitus and ear discharge.   Respiratory: Negative. Negative for cough, hemoptysis, sputum production, shortness of breath, wheezing and stridor.   Cardiovascular: Negative. Negative for chest pain, palpitations, orthopnea, claudication, leg swelling and PND.   Gastrointestinal: Denies nausea, vomiting, diarrhea, constipation, heartburn, melena or hematochezia.  Genitourinary:  Denies dysuria, hematuria, urinary incontinence, frequency or urgency.        Objective:     /88 (BP Location: Right arm, Patient Position: Sitting, BP Cuff Size: Adult)   Pulse 84   Temp 36.5 °C (97.7 °F) (Temporal)   Wt 106.1 kg (234 lb)   SpO2 95%  Body mass index is 30.87 kg/m².    Physical Exam:  Vitals reviewed.  Constitutional: Oriented to person, place, and time. appears well-developed and well-nourished. No distress.   Cardiovascular: Normal rate, regular rhythm, normal heart sounds and intact distal pulses. Exam reveals no gallop and no friction rub. No murmur heard. No carotid bruits.   Pulmonary/Chest: Effort normal and breath sounds normal. No stridor. No respiratory distress. no wheezes or rales. exhibits no tenderness.   Musculoskeletal: Normal range of motion. exhibits rounded swollen edematous area on rt elbow. kita pedal pulses 2+.  Lymphadenopathy: No cervical or supraclavicular adenopathy.   Neurological: Alert and oriented to person, place, and time. exhibits normal muscle tone.  Skin: Skin is warm and dry. No diaphoresis.   Psychiatric: Normal mood and affect. Behavior is normal.      Assessment and Plan:     The following treatment plan was discussed:    1. Cold sore  acyclovir (ZOVIRAX) 400 MG tablet    refill acyclovir.  uses occas   2. Decreased libido  TESTOSTERONE F&T MALE ADULT    Comp Metabolic Panel    TSH    FREE THYROXINE    check lab with testosterone, CMP, LP, D, TSH, T4.  F/U for lab review   3. Alopecia  TESTOSTERONE F&T MALE ADULT    Comp Metabolic Panel    has been loosing hair.  will chk thyroid lab.  f/u for review   4. Fatigue, unspecified type  TESTOSTERONE F&T MALE ADULT    Comp Metabolic Panel    TSH    FREE THYROXINE    VITAMIN D,25 HYDROXY    using energy drinks multiple times daily.  advised pt to stop.  check lab for poss etiologies.  f/u for review   5. Screening for thyroid disorder  TSH    FREE THYROXINE   6. Screening for hyperlipidemia  Lipid Profile    7. Need for Tdap vaccination  Tdap =>8yo IM   8. Encounter for vitamin deficiency screening  VITAMIN D,25 HYDROXY   9. Screening for multiple conditions  Comp Metabolic Panel         Followup: Return in about 4 weeks (around 11/18/2020).

## 2020-10-30 ENCOUNTER — HOSPITAL ENCOUNTER (OUTPATIENT)
Dept: LAB | Facility: MEDICAL CENTER | Age: 40
End: 2020-10-30
Attending: NURSE PRACTITIONER
Payer: COMMERCIAL

## 2020-10-30 DIAGNOSIS — Z13.21 ENCOUNTER FOR VITAMIN DEFICIENCY SCREENING: ICD-10-CM

## 2020-10-30 DIAGNOSIS — L65.9 ALOPECIA: ICD-10-CM

## 2020-10-30 DIAGNOSIS — R53.83 FATIGUE, UNSPECIFIED TYPE: ICD-10-CM

## 2020-10-30 DIAGNOSIS — Z13.89 SCREENING FOR MULTIPLE CONDITIONS: ICD-10-CM

## 2020-10-30 DIAGNOSIS — R68.82 DECREASED LIBIDO: ICD-10-CM

## 2020-10-30 DIAGNOSIS — Z13.220 SCREENING FOR HYPERLIPIDEMIA: ICD-10-CM

## 2020-10-30 DIAGNOSIS — Z13.29 SCREENING FOR THYROID DISORDER: ICD-10-CM

## 2020-10-30 LAB
ALBUMIN SERPL BCP-MCNC: 4.3 G/DL (ref 3.2–4.9)
ALBUMIN/GLOB SERPL: 1.5 G/DL
ALP SERPL-CCNC: 80 U/L (ref 30–99)
ALT SERPL-CCNC: 32 U/L (ref 2–50)
ANION GAP SERPL CALC-SCNC: 11 MMOL/L (ref 7–16)
AST SERPL-CCNC: 22 U/L (ref 12–45)
BILIRUB SERPL-MCNC: 0.6 MG/DL (ref 0.1–1.5)
BUN SERPL-MCNC: 15 MG/DL (ref 8–22)
CALCIUM SERPL-MCNC: 9.6 MG/DL (ref 8.4–10.2)
CHLORIDE SERPL-SCNC: 103 MMOL/L (ref 96–112)
CHOLEST SERPL-MCNC: 205 MG/DL (ref 100–199)
CO2 SERPL-SCNC: 25 MMOL/L (ref 20–33)
CREAT SERPL-MCNC: 0.89 MG/DL (ref 0.5–1.4)
FASTING STATUS PATIENT QL REPORTED: NORMAL
GLOBULIN SER CALC-MCNC: 2.9 G/DL (ref 1.9–3.5)
GLUCOSE SERPL-MCNC: 90 MG/DL (ref 65–99)
HDLC SERPL-MCNC: 44 MG/DL
LDLC SERPL CALC-MCNC: 146 MG/DL
POTASSIUM SERPL-SCNC: 4.3 MMOL/L (ref 3.6–5.5)
PROT SERPL-MCNC: 7.2 G/DL (ref 6–8.2)
SODIUM SERPL-SCNC: 139 MMOL/L (ref 135–145)
T4 FREE SERPL-MCNC: 1.08 NG/DL (ref 0.93–1.7)
TRIGL SERPL-MCNC: 77 MG/DL (ref 0–149)
TSH SERPL DL<=0.005 MIU/L-ACNC: 1.35 UIU/ML (ref 0.38–5.33)

## 2020-10-30 PROCEDURE — 82306 VITAMIN D 25 HYDROXY: CPT

## 2020-10-30 PROCEDURE — 84403 ASSAY OF TOTAL TESTOSTERONE: CPT

## 2020-10-30 PROCEDURE — 84439 ASSAY OF FREE THYROXINE: CPT

## 2020-10-30 PROCEDURE — 80053 COMPREHEN METABOLIC PANEL: CPT

## 2020-10-30 PROCEDURE — 84270 ASSAY OF SEX HORMONE GLOBUL: CPT

## 2020-10-30 PROCEDURE — 84402 ASSAY OF FREE TESTOSTERONE: CPT

## 2020-10-30 PROCEDURE — 80061 LIPID PANEL: CPT

## 2020-10-30 PROCEDURE — 84443 ASSAY THYROID STIM HORMONE: CPT

## 2020-10-30 PROCEDURE — 36415 COLL VENOUS BLD VENIPUNCTURE: CPT

## 2020-10-31 LAB — 25(OH)D3 SERPL-MCNC: 54 NG/ML (ref 30–100)

## 2020-11-01 LAB
SHBG SERPL-SCNC: 49 NMOL/L (ref 11–80)
TESTOST FREE MFR SERPL: 1.5 % (ref 1.6–2.9)
TESTOST FREE SERPL-MCNC: 79 PG/ML (ref 47–244)
TESTOST SERPL-MCNC: 524 NG/DL (ref 300–890)

## 2020-11-02 ENCOUNTER — TELEPHONE (OUTPATIENT)
Dept: MEDICAL GROUP | Facility: MEDICAL CENTER | Age: 40
End: 2020-11-02

## 2020-11-02 NOTE — TELEPHONE ENCOUNTER
----- Message from PANCHITO Stockton sent at 11/1/2020 12:48 PM PST -----  Please have pt set appointment to review and discuss treatment for labs.

## 2020-11-02 NOTE — LETTER
November 2, 2020        Maik Lopez  [address] [place] [address]        Dear Maik:    After careful review of your chart, we have noted you are due for a follow up appointment.  We request you call our office at [phone] at your earliest convenience and make an appointment.     We look forward to scheduling an appointment for you, so that we may provide you with the safest and most complete medical care.        If you have any questions or concerns, please don't hesitate to call.        Sincerely,        JERE Stockton.    Electronically Signed

## 2020-11-04 ENCOUNTER — OFFICE VISIT (OUTPATIENT)
Dept: MEDICAL GROUP | Facility: MEDICAL CENTER | Age: 40
End: 2020-11-04
Payer: COMMERCIAL

## 2020-11-04 VITALS
BODY MASS INDEX: 29.65 KG/M2 | HEIGHT: 74 IN | WEIGHT: 231 LBS | DIASTOLIC BLOOD PRESSURE: 82 MMHG | OXYGEN SATURATION: 96 % | HEART RATE: 72 BPM | SYSTOLIC BLOOD PRESSURE: 128 MMHG | TEMPERATURE: 97.7 F

## 2020-11-04 DIAGNOSIS — M94.0 COSTOCHONDRITIS: ICD-10-CM

## 2020-11-04 DIAGNOSIS — M70.21 OLECRANON BURSITIS OF RIGHT ELBOW: ICD-10-CM

## 2020-11-04 DIAGNOSIS — E78.00 ELEVATED LDL CHOLESTEROL LEVEL: ICD-10-CM

## 2020-11-04 PROCEDURE — 99214 OFFICE O/P EST MOD 30 MIN: CPT | Performed by: NURSE PRACTITIONER

## 2020-11-05 NOTE — PROGRESS NOTES
Subjective:     Maik Lopez is a 40 y.o. male who presents with elbow pain.    HPI:   Seen in f/u for hyperlipidemia.  He is not on a healthy diet.  Not exercising regularly.    Reviewed lab with pt.  His GFR, D, T4, TSH, CMP, testosterone is wnl.    LP shows good trg. HDL mildly dec at 44 but up from 41 last time.  His LDL is not at goal.  He is not on meds.  He is not on healthy diet.  Not exercising.   He was last seen for rt olecranon bursitis.  The elbow swelling is getting smaller.  No pain.  No reddness or sx of infection.  He is having chest wall anterior pain recently. It is reproducible.  No hx of injury.  Not taking anything for it.  It is a pulling sensation shahbaz in rt chest with lying down.         Patient Active Problem List    Diagnosis Date Noted   • Attention deficit hyperactivity disorder (ADHD), combined type 06/01/2018   • Obesity (BMI 30-39.9) 01/26/2017   • Insomnia 05/26/2011   • Preventative health care 05/26/2011   • ESOPHAGITIS 05/26/2011   • HEMORRHOIDS 05/26/2011   • Snoring        Current medicines (including changes today)  Current Outpatient Medications   Medication Sig Dispense Refill   • acyclovir (ZOVIRAX) 400 MG tablet Take 1 Tab by mouth 2 times a day as needed. 15 Tab 3     No current facility-administered medications for this visit.        Allergies   Allergen Reactions   • Nkda [No Known Drug Allergy]        ROS  Constitutional: Negative. Negative for fever, chills, weight loss, malaise/fatigue and diaphoresis.   HENT: Negative. Negative for hearing loss, ear pain, nosebleeds, congestion, sore throat, neck pain, tinnitus and ear discharge.   Respiratory: Negative. Negative for cough, hemoptysis, sputum production, shortness of breath, wheezing and stridor.   Cardiovascular: Negative. Negative for chest pain, palpitations, orthopnea, claudication, leg swelling and PND.   Gastrointestinal: Denies nausea, vomiting, diarrhea, constipation, heartburn, melena or  "hematochezia.  Genitourinary: Denies dysuria, hematuria, urinary incontinence, frequency or urgency.        Objective:     /82 (BP Location: Right arm, Patient Position: Sitting, BP Cuff Size: Adult)   Pulse 72   Temp 36.5 °C (97.7 °F) (Temporal)   Ht 1.867 m (6' 1.5\")   Wt 104.8 kg (231 lb)   SpO2 96%  Body mass index is 30.06 kg/m².    Physical Exam:  Vitals reviewed.  Constitutional: Oriented to person, place, and time. appears well-developed and well-nourished. No distress.   Cardiovascular: Normal rate, regular rhythm, normal heart sounds and intact distal pulses. Exam reveals no gallop and no friction rub. No murmur heard. No carotid bruits.   Pulmonary/Chest: Effort normal and breath sounds normal. No stridor. No respiratory distress. no wheezes or rales. exhibits no tenderness.   Musculoskeletal: Normal range of motion. exhibits no edema. kita pedal pulses 2+.  Lymphadenopathy: No cervical or supraclavicular adenopathy.   Neurological: Alert and oriented to person, place, and time. exhibits normal muscle tone.  Skin: Skin is warm and dry. No diaphoresis.   Psychiatric: Normal mood and affect. Behavior is normal.      Assessment and Plan:     The following treatment plan was discussed:    1. Elevated LDL cholesterol level  LIPID PANEL    recheck lab in 6m, f/u for review; start healthy diet and exercise; may consider putting on meds if not improved   2. Olecranon bursitis of right elbow      ibuprofen and ice prn; rest; f/u if worse and s/s of infection   3. Costochondritis      right chest with laying down; pulling sensation, ibuprofen prn; call if worse         Followup: Return in 6 months (on 5/4/2021), or if symptoms worsen or fail to improve.  "

## 2020-11-05 NOTE — NON-PROVIDER
Subjective:     Maik Lopez is a 40 y.o. male who presents with persistent right elbow pain.     HPI:   Pt seen for f/u of persistent right elbow pain. He has been taking aspirin/ibuprofen as needed for pain and applying ice with some relief. He has full motion on that arm. Swelling slightly decreased.     Labs reviewed with pt. CMP, thyroid panel, Vit D, testosterone are wnl. LP showed an elevated LDL at 146. He does not pay particular attention with his diet; no exercise. He has not gone back to the gym since the pandemic.     He reports right sided chest discomfort when he lays down and takes a deep breath. He describes the discomfort more of a pulling sensation, not sharp. The pain resolves when he sits back up or in upright position. This has been happening the past month and has been happening consistently when he is supine. He denies any other sx including SOB. No trauma that patient can recall.    Patient Active Problem List    Diagnosis Date Noted   • Attention deficit hyperactivity disorder (ADHD), combined type 06/01/2018   • Obesity (BMI 30-39.9) 01/26/2017   • Insomnia 05/26/2011   • Preventative health care 05/26/2011   • ESOPHAGITIS 05/26/2011   • HEMORRHOIDS 05/26/2011   • Snoring        Current medicines (including changes today)  Current Outpatient Medications   Medication Sig Dispense Refill   • acyclovir (ZOVIRAX) 400 MG tablet Take 1 Tab by mouth 2 times a day as needed. 15 Tab 3     No current facility-administered medications for this visit.        Allergies   Allergen Reactions   • Nkda [No Known Drug Allergy]        ROS  Constitutional: Negative. Negative for fever, chills, weight loss, malaise/fatigue and diaphoresis.   HENT: Negative. Negative for hearing loss, ear pain, nosebleeds, congestion, sore throat, neck pain, tinnitus and ear discharge.   Respiratory: Negative. Negative for cough, hemoptysis, sputum production, shortness of breath, wheezing and stridor.   Cardiovascular:  "Negative. Negative for chest pain, palpitations, orthopnea, claudication, leg swelling and PND.   Gastrointestinal: Denies nausea, vomiting, diarrhea, constipation, heartburn, melena or hematochezia.  Genitourinary: Denies dysuria, hematuria, urinary incontinence, frequency or urgency.        Objective:     /82 (BP Location: Right arm, Patient Position: Sitting, BP Cuff Size: Adult)   Pulse 72   Temp 36.5 °C (97.7 °F) (Temporal)   Ht 1.867 m (6' 1.5\")   Wt 104.8 kg (231 lb)   SpO2 96%  Body mass index is 30.06 kg/m².    Physical Exam:  Vitals reviewed.  Constitutional: Oriented to person, place, and time. appears well-developed and well-nourished. No distress.   Cardiovascular: Normal rate, regular rhythm, normal heart sounds and intact distal pulses. Exam reveals no gallop and no friction rub. No murmur heard. No carotid bruits.   Pulmonary/Chest: Effort normal and breath sounds normal. No stridor. No respiratory distress. no wheezes or rales. exhibits no tenderness.   Musculoskeletal: Normal range of motion. exhibits no edema. kita pedal pulses 2+. Right elbow with some swelling at the joint without erythema or warmth  Lymphadenopathy: No cervical or supraclavicular adenopathy.   Neurological: Alert and oriented to person, place, and time. exhibits normal muscle tone.  Skin: Skin is warm and dry. No diaphoresis.   Psychiatric: Normal mood and affect. Behavior is normal.      Assessment and Plan:     The following treatment plan was discussed:    1. Elevated LDL cholesterol level  LIPID PANEL    recheck lab in 6m, f/u for review; start healthy diet and exercise; may consider putting on meds if not improved   2. Olecranon bursitis of right elbow      ibuprofen and ice prn; rest; f/u if worse and s/s of infection   3. Costochondritis      right chest with laying down; pulling sensation, ibuprofen prn; call if worse         Followup: Return in 6 months (on 5/4/2021), or if symptoms worsen or fail to " improve.

## 2021-04-11 ENCOUNTER — APPOINTMENT (OUTPATIENT)
Dept: RADIOLOGY | Facility: MEDICAL CENTER | Age: 41
End: 2021-04-11
Attending: EMERGENCY MEDICINE
Payer: COMMERCIAL

## 2021-04-11 ENCOUNTER — HOSPITAL ENCOUNTER (EMERGENCY)
Facility: MEDICAL CENTER | Age: 41
End: 2021-04-11
Attending: EMERGENCY MEDICINE
Payer: COMMERCIAL

## 2021-04-11 VITALS
BODY MASS INDEX: 30.09 KG/M2 | DIASTOLIC BLOOD PRESSURE: 65 MMHG | HEART RATE: 62 BPM | TEMPERATURE: 98.1 F | SYSTOLIC BLOOD PRESSURE: 116 MMHG | WEIGHT: 227 LBS | OXYGEN SATURATION: 96 % | RESPIRATION RATE: 16 BRPM | HEIGHT: 73 IN

## 2021-04-11 DIAGNOSIS — S80.12XA CONTUSION OF LEFT LOWER EXTREMITY, INITIAL ENCOUNTER: ICD-10-CM

## 2021-04-11 PROCEDURE — 73610 X-RAY EXAM OF ANKLE: CPT | Mod: LT

## 2021-04-11 PROCEDURE — 700111 HCHG RX REV CODE 636 W/ 250 OVERRIDE (IP): Performed by: EMERGENCY MEDICINE

## 2021-04-11 PROCEDURE — 99284 EMERGENCY DEPT VISIT MOD MDM: CPT

## 2021-04-11 PROCEDURE — 73552 X-RAY EXAM OF FEMUR 2/>: CPT | Mod: RT

## 2021-04-11 PROCEDURE — 96372 THER/PROPH/DIAG INJ SC/IM: CPT

## 2021-04-11 RX ORDER — KETOROLAC TROMETHAMINE 30 MG/ML
30 INJECTION, SOLUTION INTRAMUSCULAR; INTRAVENOUS ONCE
Status: COMPLETED | OUTPATIENT
Start: 2021-04-11 | End: 2021-04-11

## 2021-04-11 RX ORDER — HYDROMORPHONE HYDROCHLORIDE 1 MG/ML
1 INJECTION, SOLUTION INTRAMUSCULAR; INTRAVENOUS; SUBCUTANEOUS ONCE
Status: COMPLETED | OUTPATIENT
Start: 2021-04-11 | End: 2021-04-11

## 2021-04-11 RX ORDER — HYDROCODONE BITARTRATE AND ACETAMINOPHEN 5; 325 MG/1; MG/1
1 TABLET ORAL EVERY 4 HOURS PRN
Qty: 8 TABLET | Refills: 0 | Status: SHIPPED | OUTPATIENT
Start: 2021-04-11 | End: 2021-04-16

## 2021-04-11 RX ORDER — NAPROXEN 500 MG/1
500 TABLET ORAL 2 TIMES DAILY WITH MEALS
Qty: 20 TABLET | Refills: 0 | Status: SHIPPED | OUTPATIENT
Start: 2021-04-11 | End: 2023-07-12

## 2021-04-11 RX ORDER — ONDANSETRON 4 MG/1
4 TABLET, ORALLY DISINTEGRATING ORAL ONCE
Status: COMPLETED | OUTPATIENT
Start: 2021-04-11 | End: 2021-04-11

## 2021-04-11 RX ADMIN — HYDROMORPHONE HYDROCHLORIDE 1 MG: 1 INJECTION, SOLUTION INTRAMUSCULAR; INTRAVENOUS; SUBCUTANEOUS at 15:09

## 2021-04-11 RX ADMIN — KETOROLAC TROMETHAMINE 30 MG: 30 INJECTION, SOLUTION INTRAMUSCULAR at 15:08

## 2021-04-11 RX ADMIN — ONDANSETRON 4 MG: 4 TABLET, ORALLY DISINTEGRATING ORAL at 15:07

## 2021-04-11 ASSESSMENT — PAIN DESCRIPTION - DESCRIPTORS: DESCRIPTORS: ACHING

## 2021-04-11 NOTE — ED TRIAGE NOTES
"Chief Complaint   Patient presents with   • Leg Pain     Pt complains of bilateral leg pain, worse pain on right. Pt states he got \" pinned\" under his car door today. Denies numbness or tingling. Pt has bruising and abrasion note don right leg.   "

## 2021-04-11 NOTE — ED NOTES
Discharge instructions given and discussed. RX for norco and naproxen  given and pt educated. Pt educated to come back to ER for new or worsening symptoms and follow up with PCP as instructed. Pt verbalized understanding. Crutches given and pt demonstrated correct use. VSS. Pt  Discharged in stable condition.

## 2021-04-11 NOTE — DISCHARGE INSTRUCTIONS
Weightbearing as tolerated, drink additional fluids for the next 48 hours.  Return for any change or worsening symptoms

## 2021-04-11 NOTE — ED PROVIDER NOTES
ED Provider Note    ED Provider    Means of arrival: Hospitalist  History obtained from: Patient  History limited by: None    CHIEF COMPLAINT  Chief Complaint   Patient presents with    Leg Pain       HPI  Maik Lopez is a 41 y.o. male who presents with complaints of pain to the right anterior thigh, left anterior thigh and left ankle.  The patient was working on a car at home, he dislodged the years and the car started rolling while he was crouching.  The door of the car pinned his legs to the ground.  He has an abrasion to the right leg.  He does have pain with movement he is able to bear weight but getting up from a sitting position is extremely painful.  His pain is in bilateral anterior thighs and left ankle.  No other injuries.  No head injury no nausea no vomiting no numbness tingling or weakness of the lower extremities    REVIEW OF SYSTEMS  See HPI for further details. All other systems are negative.     PAST MEDICAL HISTORY   has a past medical history of ADD (attention deficit disorder), Esophagitis, Hemorrhoids, Insomnia, Obesity (BMI 30-39.9) (1/26/2017), and Snoring (10/10).    SOCIAL HISTORY  Social History     Tobacco Use    Smoking status: Never Smoker    Smokeless tobacco: Never Used   Substance and Sexual Activity    Alcohol use: Yes     Alcohol/week: 3.0 oz     Types: 6 Cans of beer per week     Comment: occasional    Drug use: No    Sexual activity: Not on file       SURGICAL HISTORY   has a past surgical history that includes cholecystectomy.    CURRENT MEDICATIONS  Home Medications       Reviewed by Monique Bolton (Pharmacy Tech) on 04/11/21 at 1458  Med List Status: Complete     Medication Last Dose Status   Acetaminophen (TYLENOL PO) 4/11/2021 Active   acyclovir (ZOVIRAX) 400 MG tablet Not Taking Active   Cholecalciferol (D3 VITAMIN PO) 4/10/2021 Active                    ALLERGIES  Allergies   Allergen Reactions    Nkda [No Known Drug Allergy]        PHYSICAL EXAM  VITAL  "SIGNS: /65   Pulse 62   Temp 36.7 °C (98.1 °F) (Temporal)   Resp 16   Ht 1.854 m (6' 1\")   Wt 103 kg (227 lb)   SpO2 96%   BMI 29.95 kg/m²   Constitutional: Alert in no apparent distress.  HENT: No signs of trauma, Mucous membranes are moist   Eyes:  Conjunctiva normal, Non-icteric.   Neck: Normal range of motion,, Supple,  Lymphatic: No lymphadenopathy noted.   Cardiovascular: No cyanosis of the lower extremities.   Thorax & Lungs: , No respiratory distress, respirations are even and unlabored  Abdomen: No abdominal distention  Skin: Warm, Dry,Normal color, there is an abrasion to the right thigh, grade 2.  Left thigh has grade 1 abrasion  Extremities:No edema,   Musculoskeletal: Abrasion as described above.  There is tenderness around the abrasion in the anterior thigh.  Range of motion the knee does elicit some tenderness into the thigh.  Distal neurovascular is normal.  This is on the right.    On the left there is a superficial abrasion with mild tenderness.  There is tenderness on range of motion of the lower knee.  Distal neurovascular is normal.  Neurologic: Alert ,Oriented x4, Normal motor function, Normal sensory function, No focal deficits noted.   Psychiatric: Affect normal, Judgment normal, Mood normal.       MEDICAL DECISION MAKING  This is a 41 y.o. male who presents with symptoms as described above.  There is no abrasion, x-rays were done to evaluate for fracture.  I suspect her bruising, it is a limited area do not suspect high incidence of rhabdomyolysis    DIAGNOSTIC STUDIES / PROCEDURES    EKG      LABS      RADIOLOGY  DX-ANKLE 3+ VIEWS LEFT   Final Result      No radiographic evidence of acute traumatic injury      DX-FEMUR-2+ RIGHT   Final Result      No radiographic evidence of acute traumatic injury.          COURSE  Pertinent Labs & Imaging studies reviewed. (See chart for details)    2:53 PM - Patient seen and examined at bedside. Discussed plan of care,           Patient was " medicated for pain with good results.  X-rays show no fracture.  I have abrasion and probably contusions.  I instructed the patient drink lots of fluids.    In prescribing controlled substances to this patient, I certify that I have obtained and reviewed the medical history of Maik Lopez. I have also made a good denisse effort to obtain applicable records from other providers who have treated the patient and records did not demonstrate any increased risk of substance abuse that would prevent me from prescribing controlled substances.     I have conducted a physical exam and documented it. I have reviewed Mr. Lopez’s prescription history as maintained by the [place] Prescription Monitoring Program.     I have assessed the patient’s risk for abuse, dependency, and addiction using the validated Opioid Risk Tool available at https://www.mdcalc.com/ptuhtd-enlq-fvux-ort-narcotic-abuse.     Given the above, I believe the benefits of controlled substance therapy outweigh the risks. The reasons for prescribing controlled substances include non-narcotic, oral analgesic alternatives have been inadequate for pain control. Accordingly, I have discussed the risk and benefits, treatment plan, and alternative therapies with the patient.   .     Discharged home in stable condition    FINAL IMPRESSION  1. Contusion of left lower extremity, initial encounter        The note accurately reflects work and decisions made by me.  Shamar Damon D.O.  4/11/2021  9:09 PM

## 2021-05-17 ENCOUNTER — OFFICE VISIT (OUTPATIENT)
Dept: MEDICAL GROUP | Facility: MEDICAL CENTER | Age: 41
End: 2021-05-17
Payer: COMMERCIAL

## 2021-05-17 VITALS
HEIGHT: 74 IN | BODY MASS INDEX: 29.77 KG/M2 | WEIGHT: 232 LBS | SYSTOLIC BLOOD PRESSURE: 102 MMHG | TEMPERATURE: 97.7 F | OXYGEN SATURATION: 94 % | DIASTOLIC BLOOD PRESSURE: 80 MMHG | HEART RATE: 79 BPM

## 2021-05-17 DIAGNOSIS — S70.11XA THIGH HEMATOMA, RIGHT, INITIAL ENCOUNTER: ICD-10-CM

## 2021-05-17 DIAGNOSIS — B00.1 COLD SORE: ICD-10-CM

## 2021-05-17 PROCEDURE — 99214 OFFICE O/P EST MOD 30 MIN: CPT | Performed by: NURSE PRACTITIONER

## 2021-05-17 RX ORDER — ACYCLOVIR 400 MG/1
400 TABLET ORAL 2 TIMES DAILY PRN
Qty: 25 TABLET | Refills: 6 | Status: SHIPPED | OUTPATIENT
Start: 2021-05-17 | End: 2023-07-12 | Stop reason: SDUPTHER

## 2021-05-17 ASSESSMENT — PATIENT HEALTH QUESTIONNAIRE - PHQ9: CLINICAL INTERPRETATION OF PHQ2 SCORE: 0

## 2021-05-17 NOTE — PROGRESS NOTES
Subjective:     Maik Lopez is a 41 y.o. male who presents with rt leg injury.    HPI:   Seen in f/u for rt leg injury.  On 4/11/21 he was working on his car when it rolled and almost trapped him underneath.  He sustained a large hematoma on rt thigh and mod on left.  The left leg is almost back to normal.  He still has a large bulge on anterior rt thigh. Mild bruising still.  Mild pain.  On limitation in strength or ROM.  xrays done in ER were wnl of rt femur and left ankle.  Thigh sx are slowly resolving.  He has had more HSV outbreaks with the injury.  Needs his acyclovir refilled.  Taking med prn.    Patient Active Problem List    Diagnosis Date Noted   • Attention deficit hyperactivity disorder (ADHD), combined type 06/01/2018   • Obesity (BMI 30-39.9) 01/26/2017   • Insomnia 05/26/2011   • Preventative health care 05/26/2011   • ESOPHAGITIS 05/26/2011   • HEMORRHOIDS 05/26/2011   • Snoring        Current medicines (including changes today)  Current Outpatient Medications   Medication Sig Dispense Refill   • acyclovir (ZOVIRAX) 400 MG tablet Take 1 tablet by mouth 2 times a day as needed. 25 tablet 6   • Cholecalciferol (D3 VITAMIN PO) Take 1 capsule by mouth every day.     • Acetaminophen (TYLENOL PO) Take 2 Tablets by mouth as needed (For pain). Pt is not sure the strength     • naproxen (NAPROSYN) 500 MG Tab Take 1 tablet by mouth 2 times a day with meals. 20 tablet 0     No current facility-administered medications for this visit.       Allergies   Allergen Reactions   • Nkda [No Known Drug Allergy]        ROS  Constitutional: Negative. Negative for fever, chills, weight loss, malaise/fatigue and diaphoresis.   HENT: Negative. Negative for hearing loss, ear pain, nosebleeds, congestion, sore throat, neck pain, tinnitus and ear discharge.   Respiratory: Negative. Negative for cough, hemoptysis, sputum production, shortness of breath, wheezing and stridor.   Cardiovascular: Negative. Negative for  "chest pain, palpitations, orthopnea, claudication, leg swelling and PND.   Gastrointestinal: Denies nausea, vomiting, diarrhea, constipation, heartburn, melena or hematochezia.  Genitourinary: Denies dysuria, hematuria, urinary incontinence, frequency or urgency.        Objective:     /80 (BP Location: Right arm, Patient Position: Sitting)   Pulse 79   Temp 36.5 °C (97.7 °F) (Temporal)   Ht 1.867 m (6' 1.5\")   Wt 105 kg (232 lb)   SpO2 94%  Body mass index is 30.19 kg/m².    Physical Exam:  Vitals reviewed.  Constitutional: Oriented to person, place, and time. appears well-developed and well-nourished. No distress.   Cardiovascular: Normal rate, regular rhythm, normal heart sounds and intact distal pulses. Exam reveals no gallop and no friction rub. No murmur heard. No carotid bruits.   Pulmonary/Chest: Effort normal and breath sounds normal. No stridor. No respiratory distress. no wheezes or rales. exhibits no tenderness.   Musculoskeletal: Normal range of motion. exhibits mod sized hematoma/bulge in anterior rt thigh.  No erythema. kita pedal pulses 2+.  Lymphadenopathy: No cervical or supraclavicular adenopathy.   Neurological: Alert and oriented to person, place, and time. exhibits normal muscle tone.  Skin: Skin is warm and dry. No diaphoresis.   Psychiatric: Normal mood and affect. Behavior is normal.      Assessment and Plan:     The following treatment plan was discussed:    1. Thigh hematoma, right, initial encounter      hematoma slowly resolving.  will email if pain/weakness noted or sx not improved.  will do MRI femur to assess for tissue damage if needed.     2. Cold sore  acyclovir (ZOVIRAX) 400 MG tablet    refill acyclovir.  uses occas         Followup: Return in about 5 months (around 10/17/2021).call for lab slip  "

## 2021-08-04 ENCOUNTER — OFFICE VISIT (OUTPATIENT)
Dept: MEDICAL GROUP | Facility: MEDICAL CENTER | Age: 41
End: 2021-08-04
Payer: COMMERCIAL

## 2021-08-04 VITALS
OXYGEN SATURATION: 95 % | DIASTOLIC BLOOD PRESSURE: 84 MMHG | WEIGHT: 237.6 LBS | HEIGHT: 74 IN | TEMPERATURE: 97.4 F | BODY MASS INDEX: 30.49 KG/M2 | SYSTOLIC BLOOD PRESSURE: 130 MMHG | HEART RATE: 90 BPM

## 2021-08-04 DIAGNOSIS — S70.11XD HEMATOMA OF RIGHT THIGH, SUBSEQUENT ENCOUNTER: ICD-10-CM

## 2021-08-04 DIAGNOSIS — R41.840 DIFFICULTY CONCENTRATING: ICD-10-CM

## 2021-08-04 DIAGNOSIS — E78.00 ELEVATED LDL CHOLESTEROL LEVEL: ICD-10-CM

## 2021-08-04 DIAGNOSIS — F51.01 PRIMARY INSOMNIA: ICD-10-CM

## 2021-08-04 DIAGNOSIS — M25.512 ACUTE PAIN OF LEFT SHOULDER: ICD-10-CM

## 2021-08-04 DIAGNOSIS — Z13.89 SCREENING FOR MULTIPLE CONDITIONS: ICD-10-CM

## 2021-08-04 PROCEDURE — 99214 OFFICE O/P EST MOD 30 MIN: CPT | Performed by: NURSE PRACTITIONER

## 2021-08-04 RX ORDER — DICLOFENAC SODIUM 75 MG/1
75 TABLET, DELAYED RELEASE ORAL 2 TIMES DAILY
Qty: 30 TABLET | Refills: 0 | Status: SHIPPED | OUTPATIENT
Start: 2021-08-04 | End: 2022-01-13

## 2021-08-04 NOTE — PROGRESS NOTES
Subjective:     Maik Lopez is a 41 y.o. male who presents with left shoulder pain.    HPI:   Seen in f/u for left shoulder pain.  No hx of injury.  He sleeps on his arms.  Pain is in the shoulder joint.  No arm numbness.  Not exercising now.  No arm numbness or weakness.  Pain with anterior raising of arm.    He would like to see someone to help him with his difficulty concentrating.  He is trying to study and it is very difficult to concentrate.  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  He has struggled with this his whole life.  He is not sleeping well.  He has a baby on the way.  He is a light sleeper.  He has tried extra strength sleep gummy melatonin.  He cannot take any antidepressants for tx d/t his  license.    He injured his rt thigh in april.  It is still swollen.  No pain.  Had a lg hematoma in the area.    Patient Active Problem List    Diagnosis Date Noted   • Attention deficit hyperactivity disorder (ADHD), combined type 06/01/2018   • Obesity (BMI 30-39.9) 01/26/2017   • Insomnia 05/26/2011   • Preventative health care 05/26/2011   • ESOPHAGITIS 05/26/2011   • HEMORRHOIDS 05/26/2011   • Snoring        Current medicines (including changes today)  Current Outpatient Medications   Medication Sig Dispense Refill   • diclofenac DR (VOLTAREN) 75 MG Tablet Delayed Response Take 1 tablet by mouth 2 times a day. 30 tablet 0   • acyclovir (ZOVIRAX) 400 MG tablet Take 1 tablet by mouth 2 times a day as needed. 25 tablet 6   • Acetaminophen (TYLENOL PO) Take 2 Tablets by mouth as needed (For pain). Pt is not sure the strength     • naproxen (NAPROSYN) 500 MG Tab Take 1 tablet by mouth 2 times a day with meals. 20 tablet 0   • Cholecalciferol (D3 VITAMIN PO) Take 1 capsule by mouth every day.       No current facility-administered medications for this visit.       Allergies   Allergen Reactions   • Nkda [No Known Drug Allergy]        ROS  Constitutional: Negative. Negative for fever,  "chills, weight loss, malaise/fatigue and diaphoresis.   HENT: Negative. Negative for hearing loss, ear pain, nosebleeds, congestion, sore throat, neck pain, tinnitus and ear discharge.   Respiratory: Negative. Negative for cough, hemoptysis, sputum production, shortness of breath, wheezing and stridor.   Cardiovascular: Negative. Negative for chest pain, palpitations, orthopnea, claudication, leg swelling and PND.   Gastrointestinal: Denies nausea, vomiting, diarrhea, constipation, heartburn, melena or hematochezia.  Genitourinary: Denies dysuria, hematuria, urinary incontinence, frequency or urgency.        Objective:     /84 (BP Location: Right arm, Patient Position: Sitting)   Pulse 90   Temp 36.3 °C (97.4 °F) (Temporal)   Ht 1.867 m (6' 1.5\")   Wt 108 kg (237 lb 9.6 oz)   SpO2 95%  Body mass index is 30.92 kg/m².    Physical Exam:  Vitals reviewed.  Constitutional: Oriented to person, place, and time. appears well-developed and well-nourished. No distress.   Cardiovascular: Normal rate, regular rhythm, normal heart sounds and intact distal pulses. Exam reveals no gallop and no friction rub. No murmur heard. No carotid bruits.   Pulmonary/Chest: Effort normal and breath sounds normal. No stridor. No respiratory distress. no wheezes or rales. exhibits no tenderness.   Musculoskeletal: pain left shoulder with range of motion. exhibits mod swelling in rt anterior thigh.  Full ROM w/o pain in thigh. kita pedal pulses 2+.  Lymphadenopathy: No cervical or supraclavicular adenopathy.   Neurological: Alert and oriented to person, place, and time. exhibits normal muscle tone.  Skin: Skin is warm and dry. No diaphoresis.   Psychiatric: Normal mood and affect. Behavior is normal.      Assessment and Plan:     The following treatment plan was discussed:    1. Acute pain of left shoulder  DX-SHOULDER 2+ LEFT    diclofenac DR (VOLTAREN) 75 MG Tablet Delayed Response    xray left shoulder.  use voltaren x 14 days.  if " not improved consider PT, MRI, refer ortho   2. Hematoma of right thigh, subsequent encounter  DX-FEMUR-2+ RIGHT    xray rt thigh.  consider ortho referral   f/u with pt w/test results.    3. Primary insomnia      unable to tx d/t  license.  will see if he can take doxalamine   4. Difficulty concentrating  REFERRAL TO PSYCHOLOGY    refer psych for evaluation.  cannot take meds d/t  license.  do lab and f/u for review.           Followup: Return in about 4 weeks (around 9/1/2021).

## 2021-09-03 ENCOUNTER — APPOINTMENT (RX ONLY)
Dept: URBAN - METROPOLITAN AREA CLINIC 35 | Facility: CLINIC | Age: 41
Setting detail: DERMATOLOGY
End: 2021-09-03

## 2021-09-03 DIAGNOSIS — L29.8 OTHER PRURITUS: ICD-10-CM

## 2021-09-03 DIAGNOSIS — D18.0 HEMANGIOMA: ICD-10-CM

## 2021-09-03 DIAGNOSIS — L29.89 OTHER PRURITUS: ICD-10-CM

## 2021-09-03 PROBLEM — D18.01 HEMANGIOMA OF SKIN AND SUBCUTANEOUS TISSUE: Status: ACTIVE | Noted: 2021-09-03

## 2021-09-03 PROCEDURE — ? COUNSELING

## 2021-09-03 PROCEDURE — ? PRESCRIPTION

## 2021-09-03 PROCEDURE — 99203 OFFICE O/P NEW LOW 30 MIN: CPT

## 2021-09-03 RX ORDER — FLUOCINONIDE 0.5 MG/ML
SOLUTION TOPICAL
Qty: 60 | Refills: 1 | Status: ERX | COMMUNITY
Start: 2021-09-03

## 2021-09-03 RX ORDER — CLOBETASOL PROPIONATE 0.05 G/100ML
SHAMPOO TOPICAL
Qty: 118 | Refills: 2 | Status: ERX | COMMUNITY
Start: 2021-09-03

## 2021-09-03 RX ADMIN — FLUOCINONIDE 1: 0.5 SOLUTION TOPICAL at 00:00

## 2021-09-03 RX ADMIN — CLOBETASOL PROPIONATE 1: 0.05 SHAMPOO TOPICAL at 00:00

## 2021-09-03 ASSESSMENT — LOCATION ZONE DERM: LOCATION ZONE: SCALP

## 2021-09-03 ASSESSMENT — LOCATION SIMPLE DESCRIPTION DERM
LOCATION SIMPLE: LEFT SCALP
LOCATION SIMPLE: SCALP

## 2021-09-03 ASSESSMENT — LOCATION DETAILED DESCRIPTION DERM
LOCATION DETAILED: LEFT CENTRAL FRONTAL SCALP
LOCATION DETAILED: LEFT SUPERIOR PARIETAL SCALP

## 2021-09-03 NOTE — HPI: ITCHING
How Did Your Itching Occur?: sudden in onset (over a period of weeks to a few months)
How Severe Is Your Itching?: moderate
Additional History: He is using head and shoulders and has noticed some relief.  Tried Nioxin an few months ago and scalp itched so he discontinued.

## 2022-01-13 ENCOUNTER — OFFICE VISIT (OUTPATIENT)
Dept: MEDICAL GROUP | Facility: MEDICAL CENTER | Age: 42
End: 2022-01-13
Payer: COMMERCIAL

## 2022-01-13 ENCOUNTER — HOSPITAL ENCOUNTER (OUTPATIENT)
Dept: RADIOLOGY | Facility: MEDICAL CENTER | Age: 42
End: 2022-01-13
Attending: NURSE PRACTITIONER
Payer: COMMERCIAL

## 2022-01-13 ENCOUNTER — TELEPHONE (OUTPATIENT)
Dept: MEDICAL GROUP | Facility: MEDICAL CENTER | Age: 42
End: 2022-01-13

## 2022-01-13 ENCOUNTER — HOSPITAL ENCOUNTER (OUTPATIENT)
Dept: RADIOLOGY | Facility: MEDICAL CENTER | Age: 42
End: 2022-01-13
Attending: PHYSICIAN ASSISTANT
Payer: COMMERCIAL

## 2022-01-13 VITALS
WEIGHT: 235 LBS | DIASTOLIC BLOOD PRESSURE: 70 MMHG | HEIGHT: 74 IN | SYSTOLIC BLOOD PRESSURE: 122 MMHG | OXYGEN SATURATION: 99 % | HEART RATE: 93 BPM | TEMPERATURE: 97.2 F | BODY MASS INDEX: 30.16 KG/M2

## 2022-01-13 DIAGNOSIS — G89.29 CHRONIC PAIN OF LEFT KNEE: ICD-10-CM

## 2022-01-13 DIAGNOSIS — M25.562 CHRONIC PAIN OF LEFT KNEE: ICD-10-CM

## 2022-01-13 DIAGNOSIS — M25.512 ACUTE PAIN OF LEFT SHOULDER: ICD-10-CM

## 2022-01-13 PROBLEM — M25.561 CHRONIC PAIN OF RIGHT KNEE: Status: ACTIVE | Noted: 2022-01-13

## 2022-01-13 PROCEDURE — 99214 OFFICE O/P EST MOD 30 MIN: CPT | Performed by: PHYSICIAN ASSISTANT

## 2022-01-13 PROCEDURE — 73030 X-RAY EXAM OF SHOULDER: CPT | Mod: LT

## 2022-01-13 PROCEDURE — 73721 MRI JNT OF LWR EXTRE W/O DYE: CPT | Mod: LT

## 2022-01-13 PROCEDURE — 73562 X-RAY EXAM OF KNEE 3: CPT | Mod: LT

## 2022-01-13 ASSESSMENT — PATIENT HEALTH QUESTIONNAIRE - PHQ9: CLINICAL INTERPRETATION OF PHQ2 SCORE: 0

## 2022-01-14 NOTE — TELEPHONE ENCOUNTER
Please let pt know that the shoulder xray shows mild OA only.  If still having sx then recommend that he do PT initially.  Then if not getting better will plan on doing MRI.  Let me know if he is ok with PT referral

## 2022-01-14 NOTE — PROGRESS NOTES
Subjective:   Maik Lopez is a 41 y.o. male here today for chronic left knee pain    Chronic pain of left knee  Patient is a pleasant 41-year-old male who comes in with chronic left knee pain. Reports having a remote injury a t fex ex few years ago where he did hit the side of it.  He was evaluated at that time with x-rays that were negative.  Does not recall having an MRI.  At that time he also had right shoulder evaluation that was positive for a labrum tear.  He had it surgically repaired by Dr. England.  Shoulder has improved significantly since surgery  . What he has noticed is progressively with the knee since this time is that it has become more stiff and painful with certain movements. When patient has his knee bent for periods of times and tries to straighten it he noticed discomfort. Can bear weight on it but might have pain for a bit prior. Has not taken anti-inflammatories for it as it does not occur all the time.. Denies numbness or tingling down the extremity         Current medicines (including changes today)  Current Outpatient Medications   Medication Sig Dispense Refill   • acyclovir (ZOVIRAX) 400 MG tablet Take 1 tablet by mouth 2 times a day as needed. 25 tablet 6   • Cholecalciferol (D3 VITAMIN PO) Take 1 capsule by mouth every day.     • Acetaminophen (TYLENOL PO) Take 2 Tablets by mouth as needed (For pain). Pt is not sure the strength     • naproxen (NAPROSYN) 500 MG Tab Take 1 tablet by mouth 2 times a day with meals. 20 tablet 0     No current facility-administered medications for this visit.     He  has a past medical history of ADD (attention deficit disorder), Esophagitis, Hemorrhoids, Insomnia, Obesity (BMI 30-39.9) (1/26/2017), and Snoring (10/10).  Nkda [no known drug allergy]     Social History and Family History were reviewed and updated.    ROS   No headaches, chest pain, no shortness of breath, abdominal pain, nausea, or vomiting.  All other systems were reviewed and are  "negative or noted as positive in the HPI.       Objective:     /70 (BP Location: Right arm, Patient Position: Sitting, BP Cuff Size: Large adult)   Pulse 93   Temp 36.2 °C (97.2 °F) (Temporal)   Ht 1.867 m (6' 1.5\")   Wt 107 kg (235 lb)   SpO2 99%  Body mass index is 30.58 kg/m².     Physical Exam:  Constitutional: ANO x3, no acute distress, well-nourished, well-hydrated   Skin: Warm, dry, good turgor, no rashes in visible areas.  HEENT: Is normocephalic atraumatic, good PERRLA, extraocular movements intact, TMs and oropharynx are clear with good dentition   Neck: Soft and supple, trachea midline, no masses.  No thyroid megaly or cervical lymphadenopathy noted  Cardiovascular: Regular rate and rhythm.  Normal S1 and 2, no murmurs, rubs or gallops.  No edema noted  Lungs: Clear to auscultation bilaterally.  No wheezes, rales or rhonchi.  Good inspiratory and expiratory breath sounds  Abdomen: Soft, non-tender, no masses.  No hepatosplenomegaly.  Negative Izaguirre's  Psych: Alert and oriented x3, normal affect and mood.  Neuro: Cranial nerves II through XII were assessed and intact.  Normal gait, normal cerebellar function noted  Musculoskeletal: Full range of motion, good strength against resistance test for ligamentous laxity including valgus and varus stress were negative on the left lower extremity.  Anterior and posterior drawer sign were negative.  I did have him stand and bend at the knees and he did demonstrate some left knee instability possibly suggesting meniscal involvement there is no swelling signs of ballottement, redness of the knee exam    Clinical Course/Lab Analysis:        Assessment and Plan:   The following treatment plan was discussed.  Signs and symptoms for which to return were discussed with patient at length.  Patient verbalized understanding.    1. Chronic pain of left knee  Is chronic and worsening.  Suggest anti-inflammatories will order x-ray and then MRI if needed.  Offered " orthopedic consultation and he defers until he gets enough MRI.  do not do things that exacerbate the pain    - DX-KNEE 3 VIEWS LEFT; Future  - MR-KNEE-W/O LEFT; Future         Followup: PCP in 2 weeks to go over results    Please note that this dictation was created using voice recognition software. I have made every reasonable attempt to correct obvious errors, but I expect that there are errors of grammar and possibly content that I did not discover before finalizing the note.

## 2022-01-14 NOTE — ASSESSMENT & PLAN NOTE
Patient is a pleasant 41-year-old male who comes in with chronic left knee pain. Reports having a remote injury a t fex ex few years ago where he did hit the side of it.  He was evaluated at that time with x-rays that were negative.  Does not recall having an MRI.  At that time he also had right shoulder evaluation that was positive for a labrum tear.  He had it surgically repaired by Dr. England.  Shoulder has improved significantly since surgery  . What he has noticed is progressively with the knee since this time is that it has become more stiff and painful with certain movements. When patient has his knee bent for periods of times and tries to straighten it he noticed discomfort. Can bear weight on it but might have pain for a bit prior. Has not taken anti-inflammatories for it as it does not occur all the time.. Denies numbness or tingling down the extremity

## 2022-01-15 DIAGNOSIS — M25.562 CHRONIC PAIN OF LEFT KNEE: ICD-10-CM

## 2022-01-15 DIAGNOSIS — G89.29 CHRONIC PAIN OF LEFT KNEE: ICD-10-CM

## 2022-01-20 NOTE — TELEPHONE ENCOUNTER
Pt notified via [employer].    Left message for patient to call back at [phone] for further questions.

## 2022-02-11 ENCOUNTER — APPOINTMENT (RX ONLY)
Dept: URBAN - METROPOLITAN AREA CLINIC 35 | Facility: CLINIC | Age: 42
Setting detail: DERMATOLOGY
End: 2022-02-11

## 2022-02-11 DIAGNOSIS — L64.8 OTHER ANDROGENIC ALOPECIA: ICD-10-CM

## 2022-02-11 DIAGNOSIS — L29.8 OTHER PRURITUS: ICD-10-CM

## 2022-02-11 DIAGNOSIS — L29.89 OTHER PRURITUS: ICD-10-CM

## 2022-02-11 PROBLEM — L65.9 NONSCARRING HAIR LOSS, UNSPECIFIED: Status: ACTIVE | Noted: 2022-02-11

## 2022-02-11 PROCEDURE — 99212 OFFICE O/P EST SF 10 MIN: CPT | Mod: 25

## 2022-02-11 PROCEDURE — ? ADDITIONAL NOTES

## 2022-02-11 PROCEDURE — 11104 PUNCH BX SKIN SINGLE LESION: CPT

## 2022-02-11 PROCEDURE — ? BIOPSY BY PUNCH METHOD

## 2022-02-11 PROCEDURE — ? COUNSELING

## 2022-02-11 ASSESSMENT — LOCATION DETAILED DESCRIPTION DERM
LOCATION DETAILED: LEFT CENTRAL FRONTAL SCALP
LOCATION DETAILED: LEFT SUPERIOR PARIETAL SCALP

## 2022-02-11 ASSESSMENT — LOCATION SIMPLE DESCRIPTION DERM
LOCATION SIMPLE: LEFT SCALP
LOCATION SIMPLE: SCALP

## 2022-02-11 ASSESSMENT — LOCATION ZONE DERM: LOCATION ZONE: SCALP

## 2022-02-11 NOTE — PROCEDURE: ADDITIONAL NOTES
Additional Notes: Will hold off additional treatment until biopsy results return
Render Risk Assessment In Note?: no
Detail Level: Detailed

## 2022-02-11 NOTE — PROCEDURE: BIOPSY BY PUNCH METHOD

## 2022-02-25 ENCOUNTER — APPOINTMENT (RX ONLY)
Dept: URBAN - METROPOLITAN AREA CLINIC 35 | Facility: CLINIC | Age: 42
Setting detail: DERMATOLOGY
End: 2022-02-25

## 2022-02-25 DIAGNOSIS — Z48.02 ENCOUNTER FOR REMOVAL OF SUTURES: ICD-10-CM

## 2022-02-25 PROCEDURE — ? SUTURE REMOVAL (GLOBAL PERIOD)

## 2022-02-25 ASSESSMENT — LOCATION DETAILED DESCRIPTION DERM: LOCATION DETAILED: LEFT CENTRAL FRONTAL SCALP

## 2022-02-25 ASSESSMENT — LOCATION ZONE DERM: LOCATION ZONE: SCALP

## 2022-02-25 ASSESSMENT — LOCATION SIMPLE DESCRIPTION DERM: LOCATION SIMPLE: LEFT SCALP

## 2022-02-25 NOTE — PROCEDURE: SUTURE REMOVAL (GLOBAL PERIOD)
Detail Level: Detailed
Add 17965 Cpt? (Important Note: In 2017 The Use Of 08432 Is Being Tracked By Cms To Determine Future Global Period Reimbursement For Global Periods): no

## 2023-06-02 DIAGNOSIS — B00.1 COLD SORE: ICD-10-CM

## 2023-06-03 RX ORDER — ACYCLOVIR 400 MG/1
400 TABLET ORAL 2 TIMES DAILY PRN
Qty: 30 TABLET | Refills: 6 | OUTPATIENT
Start: 2023-06-03

## 2023-07-12 ENCOUNTER — OFFICE VISIT (OUTPATIENT)
Dept: MEDICAL GROUP | Facility: MEDICAL CENTER | Age: 43
End: 2023-07-12
Payer: COMMERCIAL

## 2023-07-12 VITALS
DIASTOLIC BLOOD PRESSURE: 70 MMHG | HEIGHT: 74 IN | TEMPERATURE: 97.5 F | SYSTOLIC BLOOD PRESSURE: 100 MMHG | WEIGHT: 228 LBS | OXYGEN SATURATION: 96 % | BODY MASS INDEX: 29.26 KG/M2 | HEART RATE: 70 BPM | RESPIRATION RATE: 17 BRPM

## 2023-07-12 DIAGNOSIS — E78.00 ELEVATED LDL CHOLESTEROL LEVEL: ICD-10-CM

## 2023-07-12 DIAGNOSIS — F51.04 CHRONIC INSOMNIA: ICD-10-CM

## 2023-07-12 DIAGNOSIS — Z12.5 SCREENING FOR PROSTATE CANCER: ICD-10-CM

## 2023-07-12 DIAGNOSIS — Z13.21 ENCOUNTER FOR VITAMIN DEFICIENCY SCREENING: ICD-10-CM

## 2023-07-12 DIAGNOSIS — B00.1 COLD SORE: ICD-10-CM

## 2023-07-12 DIAGNOSIS — Z84.89 FAMILY HISTORY OF CARCINOMA IN SITU OF PROSTATE: ICD-10-CM

## 2023-07-12 PROCEDURE — 99214 OFFICE O/P EST MOD 30 MIN: CPT | Performed by: NURSE PRACTITIONER

## 2023-07-12 PROCEDURE — 3074F SYST BP LT 130 MM HG: CPT | Performed by: NURSE PRACTITIONER

## 2023-07-12 PROCEDURE — 3078F DIAST BP <80 MM HG: CPT | Performed by: NURSE PRACTITIONER

## 2023-07-12 RX ORDER — ACYCLOVIR 400 MG/1
400 TABLET ORAL 2 TIMES DAILY PRN
Qty: 90 TABLET | Refills: 0 | Status: SHIPPED | OUTPATIENT
Start: 2023-07-12 | End: 2023-08-19

## 2023-07-12 ASSESSMENT — PATIENT HEALTH QUESTIONNAIRE - PHQ9: CLINICAL INTERPRETATION OF PHQ2 SCORE: 0

## 2023-07-12 NOTE — PROGRESS NOTES
Subjective:     Maik Lopez is a 43 y.o. male who presents with HSV infection.    HPI:   Seen in f/u for HSV infection.  He is feeling well.  He has HSV infection that he uses acyclovir for tx.  He has had more outbreaks recently over last 2 mo.   His father was just dx and tx with prostatectomy.  Fathers PSA is now wnl.  He would like PSA done.  He denies any urinary difficulty.  He is still having insomnia issues.  When he had his son he didn't sleep for 1.5 yrs well.  Now doing better. Failed melatonin and benadryl.    He is due updated lab.      Patient Active Problem List    Diagnosis Date Noted    Chronic pain of left knee 01/13/2022    Attention deficit hyperactivity disorder (ADHD), combined type 06/01/2018    Obesity (BMI 30-39.9) 01/26/2017    Insomnia 05/26/2011    ESOPHAGITIS 05/26/2011    HEMORRHOIDS 05/26/2011    Snoring        Current medicines (including changes today)  Current Outpatient Medications   Medication Sig Dispense Refill    acyclovir (ZOVIRAX) 400 MG tablet Take 1 Tablet by mouth 2 times a day as needed (hsv infection). 90 Tablet 0    Cholecalciferol (D3 VITAMIN PO) Take 1 capsule by mouth every day.      Acetaminophen (TYLENOL PO) Take 2 Tablets by mouth as needed (For pain). Pt is not sure the strength       No current facility-administered medications for this visit.       Allergies   Allergen Reactions    Nkda [No Known Drug Allergy]        ROS  Constitutional: Negative. Negative for fever, chills, weight loss, malaise/fatigue and diaphoresis.   HENT: Negative. Negative for hearing loss, ear pain, nosebleeds, congestion, sore throat, neck pain, tinnitus and ear discharge.   Respiratory: Negative. Negative for cough, hemoptysis, sputum production, shortness of breath, wheezing and stridor.   Cardiovascular: Negative. Negative for chest pain, palpitations, orthopnea, claudication, leg swelling and PND.   Gastrointestinal: Denies nausea, vomiting, diarrhea, constipation,  "heartburn, melena or hematochezia.  Genitourinary: Denies dysuria, hematuria, urinary incontinence, frequency or urgency.        Objective:     /70 (BP Location: Right arm, Patient Position: Sitting, BP Cuff Size: Adult)   Pulse 70   Temp 36.4 °C (97.5 °F) (Temporal)   Resp 17   Ht 1.867 m (6' 1.5\")   Wt 103 kg (228 lb)   SpO2 96%  Body mass index is 29.67 kg/m².    Physical Exam:  Vitals reviewed.  Constitutional: Oriented to person, place, and time. appears well-developed and well-nourished. No distress.   Cardiovascular: Normal rate, regular rhythm, normal heart sounds and intact distal pulses. Exam reveals no gallop and no friction rub. No murmur heard. No carotid bruits.   Pulmonary/Chest: Effort normal and breath sounds normal. No stridor. No respiratory distress. no wheezes or rales. exhibits no tenderness.   Musculoskeletal: Normal range of motion. exhibits no edema. kita pedal pulses 2+.  Lymphadenopathy: No cervical or supraclavicular adenopathy.   Neurological: Alert and oriented to person, place, and time. exhibits normal muscle tone.  Skin: Skin is warm and dry. No diaphoresis.   Psychiatric: Normal mood and affect. Behavior is normal.      Assessment and Plan:     The following treatment plan was discussed:    1. Cold sore  acyclovir (ZOVIRAX) 400 MG tablet    refill acyclovir.  uses occas. do lab.  f/u w/pt w/results.  then f/u yearly or sooner if lab abn      2. Family history of carcinoma in situ of prostate      father with recent dx of prostate cancer.  pt will do PSA.  no sx.      3. Chronic insomnia      will try otc doxalamine      4. Screening for prostate cancer  PSA TOTAL + %FREE            Followup: Return in about 1 year (around 7/12/2024), or call for lab slip.  "

## 2024-03-29 ENCOUNTER — HOSPITAL ENCOUNTER (OUTPATIENT)
Dept: LAB | Facility: MEDICAL CENTER | Age: 44
End: 2024-03-29
Attending: NURSE PRACTITIONER
Payer: COMMERCIAL

## 2024-03-29 DIAGNOSIS — Z12.5 SCREENING FOR PROSTATE CANCER: ICD-10-CM

## 2024-03-29 DIAGNOSIS — Z13.21 ENCOUNTER FOR VITAMIN DEFICIENCY SCREENING: ICD-10-CM

## 2024-03-29 DIAGNOSIS — E78.00 ELEVATED LDL CHOLESTEROL LEVEL: ICD-10-CM

## 2024-03-29 LAB
25(OH)D3 SERPL-MCNC: 34 NG/ML (ref 30–100)
ALBUMIN SERPL BCP-MCNC: 4.2 G/DL (ref 3.2–4.9)
ALBUMIN/GLOB SERPL: 1.6 G/DL
ALP SERPL-CCNC: 79 U/L (ref 30–99)
ALT SERPL-CCNC: 36 U/L (ref 2–50)
ANION GAP SERPL CALC-SCNC: 9 MMOL/L (ref 7–16)
AST SERPL-CCNC: 23 U/L (ref 12–45)
BILIRUB SERPL-MCNC: 0.5 MG/DL (ref 0.1–1.5)
BUN SERPL-MCNC: 17 MG/DL (ref 8–22)
CALCIUM ALBUM COR SERPL-MCNC: 9.2 MG/DL (ref 8.5–10.5)
CALCIUM SERPL-MCNC: 9.4 MG/DL (ref 8.4–10.2)
CHLORIDE SERPL-SCNC: 107 MMOL/L (ref 96–112)
CHOLEST SERPL-MCNC: 188 MG/DL (ref 100–199)
CO2 SERPL-SCNC: 25 MMOL/L (ref 20–33)
CREAT SERPL-MCNC: 0.91 MG/DL (ref 0.5–1.4)
FASTING STATUS PATIENT QL REPORTED: NORMAL
GFR SERPLBLD CREATININE-BSD FMLA CKD-EPI: 106 ML/MIN/1.73 M 2
GLOBULIN SER CALC-MCNC: 2.7 G/DL (ref 1.9–3.5)
GLUCOSE SERPL-MCNC: 106 MG/DL (ref 65–99)
HDLC SERPL-MCNC: 38 MG/DL
LDLC SERPL CALC-MCNC: 134 MG/DL
POTASSIUM SERPL-SCNC: 4.3 MMOL/L (ref 3.6–5.5)
PROT SERPL-MCNC: 6.9 G/DL (ref 6–8.2)
SODIUM SERPL-SCNC: 141 MMOL/L (ref 135–145)
TRIGL SERPL-MCNC: 81 MG/DL (ref 0–149)

## 2024-03-29 PROCEDURE — 36415 COLL VENOUS BLD VENIPUNCTURE: CPT

## 2024-03-29 PROCEDURE — 84153 ASSAY OF PSA TOTAL: CPT

## 2024-03-29 PROCEDURE — 80061 LIPID PANEL: CPT

## 2024-03-29 PROCEDURE — 82306 VITAMIN D 25 HYDROXY: CPT

## 2024-03-29 PROCEDURE — 84154 ASSAY OF PSA FREE: CPT

## 2024-03-29 PROCEDURE — 80053 COMPREHEN METABOLIC PANEL: CPT

## 2024-03-30 LAB
PSA FREE MFR SERPL: 60 %
PSA FREE SERPL-MCNC: 0.3 NG/ML
PSA SERPL-MCNC: 0.5 NG/ML (ref 0–4)

## 2024-04-11 ENCOUNTER — OFFICE VISIT (OUTPATIENT)
Dept: MEDICAL GROUP | Facility: MEDICAL CENTER | Age: 44
End: 2024-04-11
Payer: COMMERCIAL

## 2024-04-11 VITALS
HEIGHT: 74 IN | BODY MASS INDEX: 30.62 KG/M2 | OXYGEN SATURATION: 94 % | HEART RATE: 77 BPM | DIASTOLIC BLOOD PRESSURE: 70 MMHG | TEMPERATURE: 97.9 F | WEIGHT: 238.6 LBS | SYSTOLIC BLOOD PRESSURE: 110 MMHG

## 2024-04-11 DIAGNOSIS — Z12.83 SCREENING EXAM FOR SKIN CANCER: ICD-10-CM

## 2024-04-11 DIAGNOSIS — E78.5 HYPERLIPIDEMIA LDL GOAL <130: ICD-10-CM

## 2024-04-11 DIAGNOSIS — B00.1 COLD SORE: ICD-10-CM

## 2024-04-11 DIAGNOSIS — K62.89 RECTAL NODULE: ICD-10-CM

## 2024-04-11 DIAGNOSIS — Z00.00 ENCOUNTER FOR ANNUAL PHYSICAL EXAM: ICD-10-CM

## 2024-04-11 DIAGNOSIS — E55.9 VITAMIN D DEFICIENCY: ICD-10-CM

## 2024-04-11 DIAGNOSIS — R73.9 HYPERGLYCEMIA: ICD-10-CM

## 2024-04-11 PROCEDURE — 3078F DIAST BP <80 MM HG: CPT | Performed by: NURSE PRACTITIONER

## 2024-04-11 PROCEDURE — 99396 PREV VISIT EST AGE 40-64: CPT | Performed by: NURSE PRACTITIONER

## 2024-04-11 PROCEDURE — 3074F SYST BP LT 130 MM HG: CPT | Performed by: NURSE PRACTITIONER

## 2024-04-11 RX ORDER — ACYCLOVIR 400 MG/1
400 TABLET ORAL 2 TIMES DAILY PRN
Qty: 100 TABLET | Refills: 0 | Status: SHIPPED | OUTPATIENT
Start: 2024-04-11

## 2024-04-11 ASSESSMENT — PATIENT HEALTH QUESTIONNAIRE - PHQ9: CLINICAL INTERPRETATION OF PHQ2 SCORE: 0

## 2024-04-11 NOTE — PROGRESS NOTES
Subjective     Maik Lopez is a 44 y.o. male who presents with Annual Exam, Bump (Anus x1year/), and Medication Refill (acyclovir (ZOVIRAX) 400 MG tablet)            HPI  Seen in f/u for PE.  He is feeling well  He has not been working out until last week.  Now working out at home with [employer].    Has been eating a lot of carbs.    Reviewed lab with pt.  GFR, PSA is wnl  Vitamin d is low normal at 34.  He is not on otc vitamin d supplement.  CMP is wnl except glucose mildly elevated at 106.    LP  shows trg at goal.  HDL is low at 38.  Previously was mildly low at 40-41.  LDL is chronic high at 134.  LDL goal is <130. Not on statin.    He has a nodule on inside of his rectum.  Has been there for over a year.  No pain or bleeding.  It did initially cause pain with wiping.      Patient Active Problem List    Diagnosis Date Noted    Chronic pain of left knee 01/13/2022    Attention deficit hyperactivity disorder (ADHD), combined type 06/01/2018    Obesity (BMI 30-39.9) 01/26/2017    Insomnia 05/26/2011    ESOPHAGITIS 05/26/2011    HEMORRHOIDS 05/26/2011    Snoring        ROS  Review of Systems   Constitutional: Negative.  Negative for fever, chills, weight loss, malaise/fatigue and diaphoresis.   HENT: Negative.  Negative for hearing loss, ear pain, nosebleeds, congestion, sore throat, neck pain, tinnitus and ear discharge.    Eyes: Negative.  Negative for blurred vision, double vision, photophobia, pain, discharge and redness.   Respiratory: Negative.  Negative for cough, hemoptysis, sputum production, shortness of breath, wheezing and stridor.    Cardiovascular: Negative.  Negative for chest pain, palpitations, orthopnea, claudication, leg swelling and PND.   Gastrointestinal: Negative.  Negative for heartburn, nausea, vomiting, abdominal pain, diarrhea, constipation, blood in stool and melena.   Genitourinary: Negative.  Negative for dysuria, urgency, frequency, incontinence, hematuria and flank pain.  "  Musculoskeletal: Negative.  Negative for myalgias, back pain, joint pain and falls.   Skin: Negative.  Negative for itching and rash.   Neurological: Negative.  Negative for dizziness, tingling, tremors, sensory change, speech change, focal weakness, seizures, loss of consciousness, weakness and headaches.   Endo/Heme/Allergies: Negative.  Negative for environmental allergies and polydipsia. Does not bruise/bleed easily.   Psychiatric/Behavioral: Negative.  Negative for depression, suicidal ideas, hallucinations, memory loss and substance abuse. The patient is not nervous/anxious and does not have insomnia.    All other systems reviewed and are negative.    Objective     /70 (BP Location: Left arm, Patient Position: Sitting, BP Cuff Size: Adult)   Pulse 77   Temp 36.6 °C (97.9 °F) (Temporal)   Ht 1.867 m (6' 1.5\")   Wt 108 kg (238 lb 9.6 oz)   SpO2 94%   BMI 31.05 kg/m²      Physical Exam  Physical Exam   Vitals reviewed.  Constitutional: oriented to person, place, and time. appears well-developed and well-nourished. No distress.   HENT: Head: Normocephalic and atraumatic. Bilateral tympanic membranes wnl w/o bulging.  Right Ear: External ear normal. Left Ear: External ear normal. Nose: Nose normal.  Mouth/Throat: Oropharynx is clear and moist. No oropharyngeal exudate. kita tm wnl. Eyes: Conjunctivae and EOM are normal. Pupils are equal, round, and reactive to light. Right eye exhibits no discharge. Left eye exhibits no discharge. No scleral icterus.    Neck: Normal range of motion. Neck supple. No JVD present.   Cardiovascular: Normal rate, regular rhythm, normal heart sounds and intact distal pulses.  Exam reveals no gallop and no friction rub.  No murmur heard.  No carotid bruits   Pulmonary/Chest: Effort normal and breath sounds normal. No stridor. No respiratory distress. no wheezes or rales. exhibits no tenderness.   Abdominal: Soft. Bowel sounds are normal. exhibits no distension and no mass. No " tenderness. no rebound and no guarding.   Rectal exam: small nodule protruding from rectum at 1900 w/o dg. Mildly red.    Musculoskeletal: Normal range of motion. exhibits no edema or tenderness.  kita pedal pulses 2+.  Lymphadenopathy:  no cervical or supraclavicular adenopathy.   Neurological: alert and oriented to person, place, and time. has normal reflexes. displays normal reflexes. No cranial nerve deficit. exhibits normal muscle tone. Coordination normal.   Skin: Skin is warm and dry. No rash noted. no diaphoresis. No erythema. No pallor.   Psychiatric: normal mood and affect. behavior is normal.       Assessment & Plan   1. Encounter for annual physical exam        2. Rectal nodule  Referral to Gastroenterology    small nodule on rectum. refer GI for eval & removal.      3. Hyperlipidemia LDL goal <130      LP shows trg at goal. HDL low, LDL sl high. enc pt to improve healthy diet & regular exercise. f/u 1 yr. call for lab slip.      4. Cold sore  acyclovir (ZOVIRAX) 400 MG tablet    refill acyclovir.  uses occas.  f/u yearly      5. Vitamin D deficiency      vitamin d low normal.  enc pt to take 2000 units vitamin D3 daily      6. Hyperglycemia      glucose mildly elevated. pt already changing diet, starting exercise. jennifer lab 1 yr w/A1c      7. Screening exam for skin cancer  Referral to Dermatology    refer derm for overall skin check

## 2024-07-17 ENCOUNTER — APPOINTMENT (RX ONLY)
Dept: URBAN - METROPOLITAN AREA CLINIC 35 | Facility: CLINIC | Age: 44
Setting detail: DERMATOLOGY
End: 2024-07-17

## 2024-07-17 DIAGNOSIS — Z71.89 OTHER SPECIFIED COUNSELING: ICD-10-CM

## 2024-07-17 DIAGNOSIS — L65.9 NONSCARRING HAIR LOSS, UNSPECIFIED: ICD-10-CM

## 2024-07-17 DIAGNOSIS — D22 MELANOCYTIC NEVI: ICD-10-CM

## 2024-07-17 DIAGNOSIS — D18.0 HEMANGIOMA: ICD-10-CM

## 2024-07-17 DIAGNOSIS — L21.8 OTHER SEBORRHEIC DERMATITIS: ICD-10-CM | Status: INADEQUATELY CONTROLLED

## 2024-07-17 DIAGNOSIS — L82.1 OTHER SEBORRHEIC KERATOSIS: ICD-10-CM

## 2024-07-17 DIAGNOSIS — L81.4 OTHER MELANIN HYPERPIGMENTATION: ICD-10-CM

## 2024-07-17 PROBLEM — D18.01 HEMANGIOMA OF SKIN AND SUBCUTANEOUS TISSUE: Status: ACTIVE | Noted: 2024-07-17

## 2024-07-17 PROBLEM — D22.5 MELANOCYTIC NEVI OF TRUNK: Status: ACTIVE | Noted: 2024-07-17

## 2024-07-17 PROCEDURE — ? OBSERVATION AND MEASURE

## 2024-07-17 PROCEDURE — ? COUNSELING

## 2024-07-17 PROCEDURE — ? SUNSCREEN RECOMMENDATIONS

## 2024-07-17 PROCEDURE — ? PRESCRIPTION

## 2024-07-17 PROCEDURE — ? TREATMENT REGIMEN

## 2024-07-17 PROCEDURE — 99214 OFFICE O/P EST MOD 30 MIN: CPT

## 2024-07-17 PROCEDURE — ? DIAGNOSIS COMMENT

## 2024-07-17 PROCEDURE — ? ADDITIONAL NOTES

## 2024-07-17 RX ORDER — MINOXIDIL 2.5 MG/1
1 TABLET ORAL QD
Qty: 45 | Refills: 3 | Status: ERX | COMMUNITY
Start: 2024-07-17

## 2024-07-17 RX ORDER — KETOCONAZOLE 20 MG/ML
1 SHAMPOO, SUSPENSION TOPICAL
Qty: 120 | Refills: 11 | Status: ERX | COMMUNITY
Start: 2024-07-17

## 2024-07-17 RX ADMIN — KETOCONAZOLE 1: 20 SHAMPOO, SUSPENSION TOPICAL at 00:00

## 2024-07-17 RX ADMIN — MINOXIDIL 1: 2.5 TABLET ORAL at 00:00

## 2024-07-17 ASSESSMENT — LOCATION SIMPLE DESCRIPTION DERM
LOCATION SIMPLE: RIGHT UPPER BACK
LOCATION SIMPLE: POSTERIOR SCALP
LOCATION SIMPLE: ANTERIOR SCALP
LOCATION SIMPLE: POSTERIOR NECK

## 2024-07-17 ASSESSMENT — LOCATION DETAILED DESCRIPTION DERM
LOCATION DETAILED: RIGHT SUPERIOR LATERAL UPPER BACK
LOCATION DETAILED: RIGHT LATERAL UPPER BACK
LOCATION DETAILED: POSTERIOR MID-PARIETAL SCALP
LOCATION DETAILED: RIGHT SUPERIOR UPPER BACK
LOCATION DETAILED: MID-FRONTAL SCALP
LOCATION DETAILED: RIGHT MEDIAL TRAPEZIAL NECK
LOCATION DETAILED: RIGHT INFERIOR UPPER BACK

## 2024-07-17 ASSESSMENT — LOCATION ZONE DERM
LOCATION ZONE: NECK
LOCATION ZONE: TRUNK
LOCATION ZONE: SCALP

## 2024-07-17 NOTE — PROCEDURE: ADDITIONAL NOTES
Additional Notes: -pt reports rogaine irritated his scalp
Render Risk Assessment In Note?: yes
Detail Level: Detailed

## 2024-07-17 NOTE — PROCEDURE: TREATMENT REGIMEN
Initiate Treatment: -ketoconazole 2 % shampoo Use to wash scalp 2-3 times a week
Detail Level: Zone
Initiate Treatment: -minoxidil 2.5 mg tablet Take one half tablet once a day

## 2024-07-17 NOTE — PROCEDURE: DIAGNOSIS COMMENT
Comment: Biopsy proven androgenic alopecia
Render Risk Assessment In Note?: yes
Detail Level: Detailed

## 2024-10-23 ENCOUNTER — APPOINTMENT (RX ONLY)
Dept: URBAN - METROPOLITAN AREA CLINIC 35 | Facility: CLINIC | Age: 44
Setting detail: DERMATOLOGY
End: 2024-10-23

## 2024-10-23 VITALS — SYSTOLIC BLOOD PRESSURE: 157 MMHG | DIASTOLIC BLOOD PRESSURE: 100 MMHG | HEART RATE: 67 BPM

## 2024-10-23 DIAGNOSIS — L21.8 OTHER SEBORRHEIC DERMATITIS: ICD-10-CM | Status: IMPROVED

## 2024-10-23 DIAGNOSIS — L65.9 NONSCARRING HAIR LOSS, UNSPECIFIED: ICD-10-CM | Status: INADEQUATELY CONTROLLED

## 2024-10-23 DIAGNOSIS — Z71.89 OTHER SPECIFIED COUNSELING: ICD-10-CM

## 2024-10-23 PROCEDURE — ? DIAGNOSIS COMMENT

## 2024-10-23 PROCEDURE — ? COUNSELING

## 2024-10-23 PROCEDURE — ? SUNSCREEN RECOMMENDATIONS

## 2024-10-23 PROCEDURE — ? TREATMENT REGIMEN

## 2024-10-23 PROCEDURE — ? ADDITIONAL NOTES

## 2024-10-23 PROCEDURE — ? PRESCRIPTION

## 2024-10-23 PROCEDURE — 99214 OFFICE O/P EST MOD 30 MIN: CPT

## 2024-10-23 RX ORDER — MINOXIDIL 2.5 MG/1
1 TABLET ORAL QD
Qty: 45 | Refills: 3 | Status: ERX

## 2024-10-23 RX ORDER — KETOCONAZOLE 20 MG/ML
SMALL AMOUNT SHAMPOO, SUSPENSION TOPICAL
Qty: 120 | Refills: 11 | Status: ERX

## 2024-10-23 ASSESSMENT — LOCATION SIMPLE DESCRIPTION DERM
LOCATION SIMPLE: POSTERIOR SCALP
LOCATION SIMPLE: ANTERIOR SCALP

## 2024-10-23 ASSESSMENT — LOCATION ZONE DERM: LOCATION ZONE: SCALP

## 2024-10-23 ASSESSMENT — LOCATION DETAILED DESCRIPTION DERM
LOCATION DETAILED: MID-FRONTAL SCALP
LOCATION DETAILED: POSTERIOR MID-PARIETAL SCALP

## 2024-10-23 NOTE — PROCEDURE: TREATMENT REGIMEN
Modify Regimen: Decrease Minoxidil 2.5 to 1/2 tablet daily
Detail Level: Zone
Plan: Elevated high blood pressure, recommended following with PCP Mary Williamson\\nDiscussed adding finasteride
Continue Regimen: - Men’s Nutrafol
Continue Regimen: Ketoconazole shampoo three times a week

## 2025-04-22 ENCOUNTER — APPOINTMENT (OUTPATIENT)
Dept: URBAN - METROPOLITAN AREA CLINIC 35 | Facility: CLINIC | Age: 45
Setting detail: DERMATOLOGY
End: 2025-04-22

## 2025-04-22 VITALS — SYSTOLIC BLOOD PRESSURE: 114 MMHG | DIASTOLIC BLOOD PRESSURE: 72 MMHG

## 2025-04-22 DIAGNOSIS — Z71.89 OTHER SPECIFIED COUNSELING: ICD-10-CM

## 2025-04-22 DIAGNOSIS — L65.9 NONSCARRING HAIR LOSS, UNSPECIFIED: ICD-10-CM

## 2025-04-22 DIAGNOSIS — L21.8 OTHER SEBORRHEIC DERMATITIS: ICD-10-CM

## 2025-04-22 PROCEDURE — ? DIAGNOSIS COMMENT

## 2025-04-22 PROCEDURE — ? PRESCRIPTION

## 2025-04-22 PROCEDURE — ? ADDITIONAL NOTES

## 2025-04-22 PROCEDURE — ? SUNSCREEN RECOMMENDATIONS

## 2025-04-22 PROCEDURE — 99214 OFFICE O/P EST MOD 30 MIN: CPT

## 2025-04-22 PROCEDURE — ? COUNSELING

## 2025-04-22 PROCEDURE — ? TREATMENT REGIMEN

## 2025-04-22 RX ORDER — KETOCONAZOLE 20 MG/ML
SHAMPOO, SUSPENSION TOPICAL
Qty: 120 | Refills: 11 | Status: ERX

## 2025-04-22 ASSESSMENT — LOCATION SIMPLE DESCRIPTION DERM
LOCATION SIMPLE: ANTERIOR SCALP
LOCATION SIMPLE: POSTERIOR SCALP

## 2025-04-22 ASSESSMENT — LOCATION DETAILED DESCRIPTION DERM
LOCATION DETAILED: MID-FRONTAL SCALP
LOCATION DETAILED: POSTERIOR MID-PARIETAL SCALP

## 2025-04-22 ASSESSMENT — LOCATION ZONE DERM: LOCATION ZONE: SCALP

## 2025-04-22 NOTE — PROCEDURE: TREATMENT REGIMEN
Modify Regimen: Decrease Minoxidil 2.5 to 1/2 tablet daily
Detail Level: Zone
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 Pt will fu with PCP and will have PCP request labs. Pt will pause minoxidil until he fu with PCP. Discussed stopping nutrafol and initiating finasteride, but once again will further with PCP. No Rx sent today. Pt will fax labs and call post PCP visit. \\n\\n1/2025 Elevated high blood pressure, recommended following with PCP Mary Williamson\\nDiscussed adding finasteride
Continue Regimen: - Men’s Nutrafol\\n\\n- oral minoxidil 2.5mg. Take half a tablet once daily.
Continue Regimen: Ketoconazole shampoo three times a week

## 2025-05-31 ENCOUNTER — PHARMACY VISIT (OUTPATIENT)
Dept: PHARMACY | Facility: MEDICAL CENTER | Age: 45
End: 2025-05-31
Payer: COMMERCIAL

## 2025-05-31 ENCOUNTER — OFFICE VISIT (OUTPATIENT)
Dept: URGENT CARE | Facility: CLINIC | Age: 45
End: 2025-05-31
Payer: COMMERCIAL

## 2025-05-31 VITALS
TEMPERATURE: 100.2 F | HEART RATE: 114 BPM | HEIGHT: 71 IN | RESPIRATION RATE: 16 BRPM | DIASTOLIC BLOOD PRESSURE: 86 MMHG | OXYGEN SATURATION: 98 % | SYSTOLIC BLOOD PRESSURE: 142 MMHG | WEIGHT: 238 LBS | BODY MASS INDEX: 33.32 KG/M2

## 2025-05-31 DIAGNOSIS — R68.89 FLU-LIKE SYMPTOMS: Primary | ICD-10-CM

## 2025-05-31 PROCEDURE — 99213 OFFICE O/P EST LOW 20 MIN: CPT | Performed by: PHYSICIAN ASSISTANT

## 2025-05-31 PROCEDURE — RXMED WILLOW AMBULATORY MEDICATION CHARGE: Performed by: PHYSICIAN ASSISTANT

## 2025-05-31 RX ORDER — DEXTROMETHORPHAN HYDROBROMIDE AND PROMETHAZINE HYDROCHLORIDE 15; 6.25 MG/5ML; MG/5ML
5 SYRUP ORAL EVERY 6 HOURS PRN
Qty: 118 ML | Refills: 0 | Status: SHIPPED | OUTPATIENT
Start: 2025-05-31 | End: 2025-06-07

## 2025-05-31 RX ORDER — BENZONATATE 100 MG/1
100 CAPSULE ORAL 3 TIMES DAILY PRN
Qty: 30 CAPSULE | Refills: 0 | Status: SHIPPED | OUTPATIENT
Start: 2025-05-31 | End: 2025-06-10

## 2025-05-31 NOTE — LETTER
May 31, 2025    To Whom It May Concern:         This is confirmation that Maik Lopez attended his scheduled appointment with Fransico Samaniego P.A.-C. on 5/31/25.  I recommend this patient remain out of work for the next 1-5 days.  He may return once he is without fevers for 24 hours and is feeling improved.          If you have any questions please do not hesitate to call me at the phone number listed below.    Sincerely,          Fransico Samaniego P.A.-C.  [phone]

## 2025-06-01 NOTE — PROGRESS NOTES
"Subjective:   Maik Lopez is a 45 y.o. male who presents for URI (X 3 days/ sore throat/ cough/ body aches )      3-4 days ago started with sore throat, body aches, fevers subjectively, congestion, malaise/fatigue.     ROS    Medications, Allergies, and current problem list reviewed today in Epic.     Objective:     BP (!) 142/86   Pulse (!) 114   Temp 37.9 °C (100.2 °F) (Temporal)   Resp 16   Ht 1.803 m (5' 11\")   Wt 108 kg (238 lb)   SpO2 98%     Physical Exam  Vitals reviewed.   Constitutional:       Appearance: Normal appearance.   HENT:      Head: Normocephalic and atraumatic.      Right Ear: Tympanic membrane, ear canal and external ear normal.      Left Ear: Tympanic membrane, ear canal and external ear normal.      Nose: Rhinorrhea present.      Mouth/Throat:      Mouth: Mucous membranes are moist.      Pharynx: Oropharynx is clear. No posterior oropharyngeal erythema.   Eyes:      Conjunctiva/sclera: Conjunctivae normal.      Pupils: Pupils are equal, round, and reactive to light.   Cardiovascular:      Rate and Rhythm: Normal rate and regular rhythm.      Heart sounds: Normal heart sounds.   Pulmonary:      Effort: Pulmonary effort is normal.      Breath sounds: Normal breath sounds.   Musculoskeletal:      Cervical back: Normal range of motion.   Lymphadenopathy:      Cervical: No cervical adenopathy.   Skin:     General: Skin is warm and dry.      Capillary Refill: Capillary refill takes less than 2 seconds.   Neurological:      Mental Status: He is alert and oriented to person, place, and time.       Assessment/Plan:     Diagnosis and associated orders:     No diagnosis found.   Comments/MDM:     ***         Differential diagnosis, natural history, supportive care, and indications for immediate follow-up discussed.    Advised the patient to follow-up with the primary care physician for recheck, reevaluation, and consideration of further management.    Please note that this dictation was " created using voice recognition software. I have made a reasonable attempt to correct obvious errors, but I expect that there are errors of grammar and possibly content that I did not discover before finalizing the note.    This note was electronically signed by Fransico Samaniego PA-C

## 2025-07-15 ENCOUNTER — APPOINTMENT (OUTPATIENT)
Dept: URBAN - METROPOLITAN AREA CLINIC 35 | Facility: CLINIC | Age: 45
Setting detail: DERMATOLOGY
End: 2025-07-15

## 2025-07-15 ENCOUNTER — HOSPITAL ENCOUNTER (OUTPATIENT)
Facility: MEDICAL CENTER | Age: 45
End: 2025-07-15
Attending: NURSE PRACTITIONER
Payer: COMMERCIAL

## 2025-07-15 ENCOUNTER — HOSPITAL ENCOUNTER (OUTPATIENT)
Dept: RADIOLOGY | Facility: MEDICAL CENTER | Age: 45
End: 2025-07-15
Attending: NURSE PRACTITIONER
Payer: COMMERCIAL

## 2025-07-15 ENCOUNTER — OFFICE VISIT (OUTPATIENT)
Dept: MEDICAL GROUP | Facility: MEDICAL CENTER | Age: 45
End: 2025-07-15
Payer: COMMERCIAL

## 2025-07-15 VITALS
DIASTOLIC BLOOD PRESSURE: 74 MMHG | WEIGHT: 231.48 LBS | HEIGHT: 71 IN | SYSTOLIC BLOOD PRESSURE: 122 MMHG | TEMPERATURE: 97 F | BODY MASS INDEX: 32.41 KG/M2 | OXYGEN SATURATION: 96 % | HEART RATE: 79 BPM

## 2025-07-15 DIAGNOSIS — R03.0 ELEVATED BLOOD PRESSURE READING WITHOUT DIAGNOSIS OF HYPERTENSION: ICD-10-CM

## 2025-07-15 DIAGNOSIS — Z30.2 ENCOUNTER FOR STERILIZATION: ICD-10-CM

## 2025-07-15 DIAGNOSIS — N50.82 SCROTAL PAIN: Primary | ICD-10-CM

## 2025-07-15 DIAGNOSIS — D22 MELANOCYTIC NEVI: ICD-10-CM

## 2025-07-15 DIAGNOSIS — L21.8 OTHER SEBORRHEIC DERMATITIS: ICD-10-CM

## 2025-07-15 DIAGNOSIS — L81.4 OTHER MELANIN HYPERPIGMENTATION: ICD-10-CM

## 2025-07-15 DIAGNOSIS — N50.82 SCROTAL PAIN: ICD-10-CM

## 2025-07-15 DIAGNOSIS — D18.0 HEMANGIOMA: ICD-10-CM

## 2025-07-15 DIAGNOSIS — E78.5 HYPERLIPIDEMIA LDL GOAL <130: Primary | ICD-10-CM

## 2025-07-15 DIAGNOSIS — Z12.11 SCREENING FOR MALIGNANT NEOPLASM OF COLON: ICD-10-CM

## 2025-07-15 DIAGNOSIS — L82.1 OTHER SEBORRHEIC KERATOSIS: ICD-10-CM

## 2025-07-15 DIAGNOSIS — R73.9 HYPERGLYCEMIA: ICD-10-CM

## 2025-07-15 DIAGNOSIS — I86.1 LEFT VARICOCELE: ICD-10-CM

## 2025-07-15 DIAGNOSIS — L65.9 NONSCARRING HAIR LOSS, UNSPECIFIED: ICD-10-CM

## 2025-07-15 DIAGNOSIS — L57.0 ACTINIC KERATOSIS: ICD-10-CM

## 2025-07-15 DIAGNOSIS — Z71.89 OTHER SPECIFIED COUNSELING: ICD-10-CM

## 2025-07-15 DIAGNOSIS — E78.5 HYPERLIPIDEMIA LDL GOAL <130: ICD-10-CM

## 2025-07-15 DIAGNOSIS — Z12.5 SCREENING FOR MALIGNANT NEOPLASM OF PROSTATE: ICD-10-CM

## 2025-07-15 DIAGNOSIS — E55.9 VITAMIN D DEFICIENCY: ICD-10-CM

## 2025-07-15 DIAGNOSIS — N43.3 HYDROCELE IN ADULT: Primary | ICD-10-CM

## 2025-07-15 PROBLEM — D23.62 OTHER BENIGN NEOPLASM OF SKIN OF LEFT UPPER LIMB, INCLUDING SHOULDER: Status: ACTIVE | Noted: 2025-07-15

## 2025-07-15 PROBLEM — D18.01 HEMANGIOMA OF SKIN AND SUBCUTANEOUS TISSUE: Status: ACTIVE | Noted: 2025-07-15

## 2025-07-15 PROBLEM — D23.72 OTHER BENIGN NEOPLASM OF SKIN OF LEFT LOWER LIMB, INCLUDING HIP: Status: ACTIVE | Noted: 2025-07-15

## 2025-07-15 PROBLEM — D22.5 MELANOCYTIC NEVI OF TRUNK: Status: ACTIVE | Noted: 2025-07-15

## 2025-07-15 LAB
25(OH)D3 SERPL-MCNC: 25 NG/ML (ref 30–100)
ALBUMIN SERPL BCP-MCNC: 4.2 G/DL (ref 3.2–4.9)
ALBUMIN/GLOB SERPL: 1.7 G/DL
ALP SERPL-CCNC: 79 U/L (ref 30–99)
ALT SERPL-CCNC: 79 U/L (ref 2–50)
ANION GAP SERPL CALC-SCNC: 11 MMOL/L (ref 7–16)
AST SERPL-CCNC: 35 U/L (ref 12–45)
BASOPHILS # BLD AUTO: 0.7 % (ref 0–1.8)
BASOPHILS # BLD: 0.04 K/UL (ref 0–0.12)
BILIRUB SERPL-MCNC: 0.4 MG/DL (ref 0.1–1.5)
BUN SERPL-MCNC: 16 MG/DL (ref 8–22)
CALCIUM ALBUM COR SERPL-MCNC: 9 MG/DL (ref 8.5–10.5)
CALCIUM SERPL-MCNC: 9.2 MG/DL (ref 8.5–10.5)
CHLORIDE SERPL-SCNC: 107 MMOL/L (ref 96–112)
CHOLEST SERPL-MCNC: 188 MG/DL (ref 100–199)
CO2 SERPL-SCNC: 22 MMOL/L (ref 20–33)
CREAT SERPL-MCNC: 0.9 MG/DL (ref 0.5–1.4)
CREAT UR-MCNC: 235 MG/DL
EOSINOPHIL # BLD AUTO: 0.07 K/UL (ref 0–0.51)
EOSINOPHIL NFR BLD: 1.2 % (ref 0–6.9)
ERYTHROCYTE [DISTWIDTH] IN BLOOD BY AUTOMATED COUNT: 41.2 FL (ref 35.9–50)
EST. AVERAGE GLUCOSE BLD GHB EST-MCNC: 131 MG/DL
FASTING STATUS PATIENT QL REPORTED: NORMAL
GFR SERPLBLD CREATININE-BSD FMLA CKD-EPI: 107 ML/MIN/1.73 M 2
GLOBULIN SER CALC-MCNC: 2.5 G/DL (ref 1.9–3.5)
GLUCOSE SERPL-MCNC: 98 MG/DL (ref 65–99)
HBA1C MFR BLD: 6.2 % (ref 4–5.6)
HCT VFR BLD AUTO: 47.3 % (ref 42–52)
HDLC SERPL-MCNC: 45 MG/DL
HGB BLD-MCNC: 16.1 G/DL (ref 14–18)
IMM GRANULOCYTES # BLD AUTO: 0.01 K/UL (ref 0–0.11)
IMM GRANULOCYTES NFR BLD AUTO: 0.2 % (ref 0–0.9)
LDLC SERPL CALC-MCNC: 126 MG/DL
LYMPHOCYTES # BLD AUTO: 1.33 K/UL (ref 1–4.8)
LYMPHOCYTES NFR BLD: 22.8 % (ref 22–41)
MCH RBC QN AUTO: 30.7 PG (ref 27–33)
MCHC RBC AUTO-ENTMCNC: 34 G/DL (ref 32.3–36.5)
MCV RBC AUTO: 90.1 FL (ref 81.4–97.8)
MICROALBUMIN UR-MCNC: <1.2 MG/DL
MICROALBUMIN/CREAT UR: NORMAL MG/G (ref 0–30)
MONOCYTES # BLD AUTO: 0.55 K/UL (ref 0–0.85)
MONOCYTES NFR BLD AUTO: 9.4 % (ref 0–13.4)
NEUTROPHILS # BLD AUTO: 3.83 K/UL (ref 1.82–7.42)
NEUTROPHILS NFR BLD: 65.7 % (ref 44–72)
NRBC # BLD AUTO: 0 K/UL
NRBC BLD-RTO: 0 /100 WBC (ref 0–0.2)
PLATELET # BLD AUTO: 293 K/UL (ref 164–446)
PMV BLD AUTO: 9.9 FL (ref 9–12.9)
POTASSIUM SERPL-SCNC: 4.3 MMOL/L (ref 3.6–5.5)
PROT SERPL-MCNC: 6.7 G/DL (ref 6–8.2)
RBC # BLD AUTO: 5.25 M/UL (ref 4.7–6.1)
SODIUM SERPL-SCNC: 140 MMOL/L (ref 135–145)
TRIGL SERPL-MCNC: 83 MG/DL (ref 0–149)
WBC # BLD AUTO: 5.8 K/UL (ref 4.8–10.8)

## 2025-07-15 PROCEDURE — 82306 VITAMIN D 25 HYDROXY: CPT

## 2025-07-15 PROCEDURE — ? DIAGNOSIS COMMENT

## 2025-07-15 PROCEDURE — 84153 ASSAY OF PSA TOTAL: CPT

## 2025-07-15 PROCEDURE — 76870 US EXAM SCROTUM: CPT

## 2025-07-15 PROCEDURE — ? SUNSCREEN RECOMMENDATIONS

## 2025-07-15 PROCEDURE — 80053 COMPREHEN METABOLIC PANEL: CPT

## 2025-07-15 PROCEDURE — 99214 OFFICE O/P EST MOD 30 MIN: CPT | Performed by: NURSE PRACTITIONER

## 2025-07-15 PROCEDURE — 82043 UR ALBUMIN QUANTITATIVE: CPT

## 2025-07-15 PROCEDURE — 3078F DIAST BP <80 MM HG: CPT | Performed by: NURSE PRACTITIONER

## 2025-07-15 PROCEDURE — 84154 ASSAY OF PSA FREE: CPT

## 2025-07-15 PROCEDURE — 82570 ASSAY OF URINE CREATININE: CPT

## 2025-07-15 PROCEDURE — 3074F SYST BP LT 130 MM HG: CPT | Performed by: NURSE PRACTITIONER

## 2025-07-15 PROCEDURE — ? OBSERVATION

## 2025-07-15 PROCEDURE — 80061 LIPID PANEL: CPT

## 2025-07-15 PROCEDURE — ? TREATMENT REGIMEN

## 2025-07-15 PROCEDURE — ? PRESCRIPTION

## 2025-07-15 PROCEDURE — ? COUNSELING

## 2025-07-15 PROCEDURE — 85025 COMPLETE CBC W/AUTO DIFF WBC: CPT

## 2025-07-15 PROCEDURE — ? LIQUID NITROGEN

## 2025-07-15 PROCEDURE — 36415 COLL VENOUS BLD VENIPUNCTURE: CPT

## 2025-07-15 PROCEDURE — 83036 HEMOGLOBIN GLYCOSYLATED A1C: CPT

## 2025-07-15 RX ORDER — CLOBETASOL PROPIONATE 0.5 MG/ML
1 SOLUTION TOPICAL BID
Qty: 50 | Refills: 3 | Status: ERX | COMMUNITY
Start: 2025-07-15

## 2025-07-15 RX ORDER — FINASTERIDE 1 MG/1
1 TABLET, FILM COATED ORAL QD
Qty: 90 | Refills: 1 | Status: ERX | COMMUNITY
Start: 2025-07-15

## 2025-07-15 RX ORDER — MINOXIDIL 2.5 MG/1
2.5 TABLET ORAL
COMMUNITY
Start: 2025-06-10

## 2025-07-15 RX ADMIN — CLOBETASOL PROPIONATE 1: 0.5 SOLUTION TOPICAL at 00:00

## 2025-07-15 RX ADMIN — FINASTERIDE 1: 1 TABLET, FILM COATED ORAL at 00:00

## 2025-07-15 ASSESSMENT — LOCATION DETAILED DESCRIPTION DERM
LOCATION DETAILED: MID-FRONTAL SCALP
LOCATION DETAILED: RIGHT LATERAL UPPER BACK
LOCATION DETAILED: EPIGASTRIC SKIN
LOCATION DETAILED: POSTERIOR MID-PARIETAL SCALP
LOCATION DETAILED: LEFT CENTRAL MALAR CHEEK
LOCATION DETAILED: RIGHT SUPERIOR LATERAL UPPER BACK
LOCATION DETAILED: LEFT SUPERIOR MEDIAL UPPER BACK
LOCATION DETAILED: LEFT RIB CAGE

## 2025-07-15 ASSESSMENT — LOCATION SIMPLE DESCRIPTION DERM
LOCATION SIMPLE: RIGHT UPPER BACK
LOCATION SIMPLE: POSTERIOR SCALP
LOCATION SIMPLE: LEFT UPPER BACK
LOCATION SIMPLE: ANTERIOR SCALP
LOCATION SIMPLE: LEFT CHEEK
LOCATION SIMPLE: ABDOMEN

## 2025-07-15 ASSESSMENT — LOCATION ZONE DERM
LOCATION ZONE: SCALP
LOCATION ZONE: TRUNK
LOCATION ZONE: FACE

## 2025-07-15 NOTE — PROCEDURE: COUNSELING
Detail Level: Zone
Detail Level: Generalized
Nicotinamide Supplementation Recommendations: [employer] advanced
Detail Level: Detailed

## 2025-07-15 NOTE — PROCEDURE: TREATMENT REGIMEN
Detail Level: Zone
Initiate Treatment: - finasteride 1 mg tablet Take 1 tablet once a day
Plan: 7/15/25 pt. 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 Pt will fu with PCP and will have PCP request labs. Pt will pause minoxidil until he fu with PCP. Discussed stopping nutrafol and initiating finasteride, but once again will further with PCP. No Rx sent today. Pt will fax labs and call post PCP visit. \\n\\n1/2025 Elevated high blood pressure, recommended following with PCP Mary Williamson\\nDiscussed adding finasteride
Continue Regimen: - Men’s Nutrafol\\n\\n- oral minoxidil 2.5mg Take half a tablet every other day
Discontinue Regimen: -pt reports rogaine irritated his scalp
Initiate Treatment: - clobetasol 0.05 % scalp solution Apply to affected areas BID PRN itch for up to 10 days. Take 1 week off. Not for use on face, groin under arms.
Continue Regimen: - Ketoconazole shampoo three times a week

## 2025-07-15 NOTE — PROCEDURE: MIPS QUALITY
Detail Level: Generalized
Quality 47: Advance Care Plan: Advance Care Planning discussed and documented; advance care plan or surrogate decision maker documented in the medical record.
Quality 226: Preventive Care And Screening: Tobacco Use: Screening And Cessation Intervention: Patient screened for tobacco use and is an ex/non-smoker
Name And Contact Information For Health Care Proxy: Esther Mingo [phone]
Quality 130: Documentation Of Current Medications In The Medical Record: Current Medications Documented

## 2025-07-15 NOTE — PROCEDURE: LIQUID NITROGEN
Duration Of Freeze Thaw-Cycle (Seconds): 2
Application Tool (Optional): Cry-AC
Show Applicator Variable?: Yes
Render Post-Care Instructions In Note?: no
Number Of Freeze-Thaw Cycles: 2 freeze-thaw cycles
Consent: The patient's consent was obtained including but not limited to risks of crusting, scabbing, blistering, scarring, darker or lighter pigmentary change, recurrence, incomplete removal and infection.
Post-Care Instructions: I reviewed with the patient in detail post-care instructions. Patient is to wear sunprotection, and avoid picking at any of the treated lesions. Pt may apply Vaseline to crusted or scabbing areas.
Detail Level: Detailed

## 2025-07-15 NOTE — PROGRESS NOTES
Subjective:     History of Present Illness  The patient is a 45-year-old male who presents for follow-up of lower groin pain, elevated blood pressure, and sleep issues.    He reports experiencing sharp pain in his right testicle that radiates upwards into his abdomen, describing it as similar to a muscle spasm. Additionally, he mentions a dull ache on the left side. The pain, which started on 07/09/2025, is intermittent and does not seem to be influenced by his position or activities. He is unsure if the pain is related to his job, which involves frequent flying and prolonged sitting, or possibly due to tight clothing causing irritation. He reports no pain or burning during urination, penile discharge, or bowel issues. He also reports no fevers, chills, or sweating. He is considering a vasectomy and is seeking a referral for the procedure.    He has been using Tylenol PM to aid sleep, taking 1 or 2 tablets at night for a week at a time. He has also tried melatonin and other over-the-counter sleep aids, but these have not always been effective.    His blood pressure was elevated during his last visit on 05/31/2025. He has been taking minoxidil 2.5 mg intermittently and took it this morning specifically to check his blood pressure. He is uncertain if his elevated readings are due to white coat syndrome or another cause. He reports feeling fatigued today.    He has not eaten or drunk anything this morning and is interested in having his PSA levels checked.     Occupations:   Sleep: Reports difficulty sleeping, often uses Tylenol PM, melatonin, and other over-the-counter sleep aids.  Living Condition: Lives with his wife, mother-in-law, and two young children.    Results  none    Current medicines (including changes today)  Current Medications[1]  Current Medications[2]    He  has a past medical history of Attention deficit hyperactivity disorder (ADHD), combined type (06/01/2018), Chronic pain of left knee  "(01/13/2022), Esophagitis, Hemorrhoids, Insomnia, Obesity (BMI 30-39.9) (01/26/2017), and Snoring (10/2010).      ROS   Review of Systems   Constitutional: Negative.  Negative for fever, chills, weight loss, malaise/fatigue and diaphoresis.   HENT: Negative.  Negative for hearing loss, ear pain, nosebleeds, congestion, sore throat, neck pain, tinnitus and ear discharge.    Respiratory: Negative.  Negative for cough, hemoptysis, sputum production, shortness of breath, wheezing and stridor.    Cardiovascular: Negative.  Negative for chest pain, palpitations, orthopnea, claudication, leg swelling and PND.   Gastrointestinal: denies nausea, vomiting, diarrhea, constipation, heartburn, melena or hematochezia.  Genitourinary: Denies dysuria, hematuria, urinary incontinence, frequency or urgency.    Musculoskeletal: Negative.  Negative for myalgias and back pain.   Neurological: Negative.  Negative for dizziness, tingling, tremors, weakness and headaches.   Psych:  Denies depression, anxiety or insomnia.  All other systems reviewed and are negative.       Objective:     /74   Pulse 79   Temp 36.1 °C (97 °F) (Temporal)   Ht 1.803 m (5' 11\")   Wt 105 kg (231 lb 7.7 oz)   SpO2 96%  Body mass index is 32.29 kg/m².   Physical Exam  Respiratory: Clear to auscultation, no wheezing, rales or rhonchi  Gastrointestinal: Soft, no tenderness, no distention, no masses  Genitourinary: No tenderness, no swelling, no discoloration  Physical Exam   Vitals reviewed.  Constitutional: oriented to person, place, and time. appears well-developed and well-nourished. No distress.   Neck: No JVD present.  Cardiovascular: Normal rate, regular rhythm, normal heart sounds and intact distal pulses.  Exam reveals no gallop and no friction rub.  No murmur heard.  No carotid bruits.   Pulmonary/Chest: Effort normal and breath sounds normal. No stridor. No respiratory distress. no wheezes or rales. exhibits no tenderness.   Musculoskeletal: " Normal range of motion. exhibits no edema. kita pedal pulses 2+.  Lymphadenopathy: no cervical or supraclavicular adenopathy.   Abd:  No CVAT,  Soft,  Bs noted in all quadrants.  No HSM.  No abdominal or groin tenderness.  Neurological: alert and oriented to person, place, and time. exhibits normal muscle tone. Coordination normal.   Skin: Skin is warm and dry. no diaphoresis.   Psychiatric: normal mood and affect. behavior is normal.       Assessment and Plan:   The following treatment plan was discussed    Assessment & Plan  1. Lower groin pain/scrotal pain  The symptoms suggest a possible hydrocele or epididymitis. A stat scrotal ultrasound has been ordered to investigate the cause of the pain. A complete blood count will be conducted to rule out infection.     2. Elevated blood pressure  His blood pressure was significantly elevated on 05/31/2025 but is currently within normal range. He has been advised to monitor his blood pressure daily for the next month, particularly after activity such as work or shopping. He should record these readings in a blood pressure diary and bring it to his next appointment. An Omron brand blood pressure cuff has been recommended.    3. Sleep issues  He has been advised to limit Tylenol intake to no more than 2000 mg per day. Doxylamine has been recommended as an alternative sleep aid. The effectiveness of Tylenol PM has been discussed, noting occasional hangover effects. Counseling provided on over-the-counter sleep aids containing doxylamine.    4. Health maintenance  A chemistry panel, complete blood count, lipid panel, vitamin D, PSA, A1c, and albumin creatinine tests have been ordered. A referral to gastroenterology for colon cancer screening has been made. Patient informed about the need for a colonoscopy at age 45. Follow-up in 1 month to review lab results and blood pressure diary.    5.  Vasectomy request  A referral to urology for a vasectomy has been made.    Follow-up:  The patient will follow up in 1 month for bp check & lab/test review.      ORDERS:  1. Scrotal pain (Primary)    - Referral to Urology  - [id number]; Future    2. Elevated blood pressure reading without diagnosis of hypertension      3. Encounter for sterilization    - Referral to Urology    4. Screening for malignant neoplasm of colon    - Referral to Gastroenterology        Please note that this dictation was created using voice recognition software. I have made every reasonable attempt to correct obvious errors, but I expect that there are errors of grammar and possibly content that I did not discover before finalizing the note.      Attestation      Verbal consent was acquired by the patient to use [employer] ambient listening note generation during this visit Yes                  [1]   Current Outpatient Medications   Medication Sig Dispense Refill    minoxidil (LONITEN) 2.5 MG Tab Take 2.5 mg by mouth every day.      acyclovir (ZOVIRAX) 400 MG tablet Take 1 Tablet by mouth 2 times a day as needed (hsv infection). 100 Tablet 0    Cholecalciferol (D3 VITAMIN PO) Take 1 capsule by mouth every day.      Acetaminophen (TYLENOL PO) Take 2 Tablets by mouth as needed (For pain). Pt is not sure the strength       No current facility-administered medications for this visit.   [2]   Current Outpatient Medications   Medication Sig Dispense Refill    minoxidil (LONITEN) 2.5 MG Tab Take 2.5 mg by mouth every day.      acyclovir (ZOVIRAX) 400 MG tablet Take 1 Tablet by mouth 2 times a day as needed (hsv infection). 100 Tablet 0    Cholecalciferol (D3 VITAMIN PO) Take 1 capsule by mouth every day.      Acetaminophen (TYLENOL PO) Take 2 Tablets by mouth as needed (For pain). Pt is not sure the strength       No current facility-administered medications for this visit.

## 2025-07-16 ENCOUNTER — OFFICE VISIT (OUTPATIENT)
Dept: MEDICAL GROUP | Facility: MEDICAL CENTER | Age: 45
End: 2025-07-16
Payer: COMMERCIAL

## 2025-07-16 VITALS
TEMPERATURE: 97 F | DIASTOLIC BLOOD PRESSURE: 62 MMHG | OXYGEN SATURATION: 95 % | HEART RATE: 77 BPM | HEIGHT: 71 IN | WEIGHT: 231 LBS | SYSTOLIC BLOOD PRESSURE: 114 MMHG | BODY MASS INDEX: 32.34 KG/M2

## 2025-07-16 DIAGNOSIS — K76.0 HEPATIC STEATOSIS: ICD-10-CM

## 2025-07-16 DIAGNOSIS — I86.1 VARICOCELE: ICD-10-CM

## 2025-07-16 DIAGNOSIS — E78.5 HYPERLIPIDEMIA LDL GOAL <100: ICD-10-CM

## 2025-07-16 DIAGNOSIS — R73.01 IFG (IMPAIRED FASTING GLUCOSE): ICD-10-CM

## 2025-07-16 DIAGNOSIS — E55.9 VITAMIN D DEFICIENCY: Primary | ICD-10-CM

## 2025-07-16 DIAGNOSIS — N43.3 HYDROCELE IN ADULT: ICD-10-CM

## 2025-07-16 PROCEDURE — 3078F DIAST BP <80 MM HG: CPT | Performed by: NURSE PRACTITIONER

## 2025-07-16 PROCEDURE — 3074F SYST BP LT 130 MM HG: CPT | Performed by: NURSE PRACTITIONER

## 2025-07-16 PROCEDURE — 99214 OFFICE O/P EST MOD 30 MIN: CPT | Performed by: NURSE PRACTITIONER

## 2025-07-16 RX ORDER — ERGOCALCIFEROL 1.25 MG/1
50000 CAPSULE, LIQUID FILLED ORAL
Qty: 12 CAPSULE | Refills: 0 | Status: SHIPPED | OUTPATIENT
Start: 2025-07-16

## 2025-07-16 ASSESSMENT — FIBROSIS 4 INDEX: FIB4 SCORE: 0.6

## 2025-07-16 NOTE — Clinical Note
REFERRAL APPROVAL NOTICE         Sent on July 16, 2025                   Maik Lopez  [address]  [place] [address]                   Dear Mr. Lopez,    After a careful review of the medical information and benefit coverage, [facility] has processed your referral. See below for additional details.    If applicable, you must be actively enrolled with your insurance for coverage of the authorized service. If you have any questions regarding your coverage, please contact your insurance directly.    REFERRAL INFORMATION   Referral #:  [id number]  Referred-To Department    Referred-By Provider:  Urology    [facility]      No address on file  Referring provider phone N/A [address]  [place] [address]  [phone]    Referral Start Date:  07/15/2025  Referral End Date:   07/15/2026             SCHEDULING  If you do not already have an appointment, please call [phone] to make an appointment.     MORE INFORMATION  If you do not already have a [employer] account, sign up at: [email or web address]  You can access your medical information, make appointments, see lab results, billing information, and more.  If you have questions regarding this referral, please contact  the [facility] Referrals department at:             [phone]. Monday - Friday 8:00AM - 5:00PM.     Sincerely,    [facility]

## 2025-07-16 NOTE — PROGRESS NOTES
Subjective:     History of Present Illness  The patient presents for a follow-up of impaired fasting glucose.    He reports experiencing constant fatigue, which he attributes to his 8-month-old child disrupting his sleep. Dietary changes have been made, including reducing sugar intake and eliminating soda from his diet. He has been struggling with high LDL levels for several years. He notes that his A1c has increased from 5.5 seven years ago to 6.2, indicating prediabetes.    He reports no allergies. Recently, he has been experiencing a low ache in his abdomen for the past few days but has not had any shooting pain. He mentions wearing tight pants for the past two weeks, which may have contributed to his symptoms.    He was advised to start finasteride by Dr. Reny Gonsales, dermatologist, for hair growth.    Occupations:   Diet: Reduced sugar intake, eliminated soda  Alcohol: Rarely drinks alcohol  Sleep: Disrupted sleep due to 8-month-old child, uses Tylenol PM to aid sleep, needs 8 hours of sleep      Results  - Laboratory Studies:    - GFR: 107    - Electrolytes: Normal    - Blood Sugar: Normal    - Kidney Function in Blood: Normal    - Calcium: Normal    - SGPT: High at 79    - SGOT: Normal    - Alkaline Phosphatase: Normal    - Total Bilirubin: Normal    - Albumin Creatinine Ratio: Zero    - Total Cholesterol: Slightly Elevated    - Triglycerides: 83    - HDL: 45    - LDL: Elevated    - A1c: 6.2    - Vitamin D: Low at 25    - Complete Blood Count: Normal    - Imaging:    - Ultrasound of the right and left side: Moderate sized fluid collection on the right and a small fluid collection on the left    Current medicines (including changes today)  Current Medications[1]  Current Medications[2]    He  has a past medical history of Attention deficit hyperactivity disorder (ADHD), combined type (06/01/2018), Chronic pain of left knee (01/13/2022), Esophagitis, Hemorrhoids, Insomnia, Obesity (BMI 30-39.9)  (01/26/2017), and Snoring (10/2010).    Hemoglobin A1c:  Lab Results   Component Value Date/Time    HBA1C 6.2 (H) 07/15/2025 07:59 AM    HBA1C 5.5 07/05/2018 09:21 AM       Microalbumin/creatinine Ratio:  Lab Results   Component Value Date/Time    URCREAT 235.00 07/15/2025 0800    MICROALBUR <1.2 07/15/2025 0800    MALBCRT see below 07/15/2025 0800       Lipid Panel:  Lab Results   Component Value Date/Time    CHOLSTRLTOT 188 07/15/2025 0759    TRIGLYCERIDE 83 07/15/2025 0759     (H) 07/15/2025 0759       Complete Blood Count:  Lab Results   Component Value Date/Time    WBC 5.8 07/15/2025 0759    RBC 5.25 07/15/2025 0759    HEMOGLOBIN 16.1 07/15/2025 0759    HEMATOCRIT 47.3 07/15/2025 0759    MCV 90.1 07/15/2025 0759    MCH 30.7 07/15/2025 0759    MCHC 34.0 07/15/2025 0759    RDW 41.2 07/15/2025 0759    MPV 9.9 07/15/2025 0759    LYMPHOCYTES 22.80 07/15/2025 0759    LYMPHS 1.33 07/15/2025 0759    MONOCYTES 9.40 07/15/2025 0759    MONOS 0.55 07/15/2025 0759    EOSINOPHILS 1.20 07/15/2025 0759    EOS 0.07 07/15/2025 0759    BASOPHILS 0.70 07/15/2025 0759    BASO 0.04 07/15/2025 0759    NRBC 0.00 07/15/2025 0759       Complete Metabolic Panel:  Lab Results   Component Value Date/Time    SODIUM 140 07/15/2025 07:59 AM    POTASSIUM 4.3 07/15/2025 07:59 AM    CHLORIDE 107 07/15/2025 07:59 AM    CO2 22 07/15/2025 07:59 AM    ANION 11.0 07/15/2025 07:59 AM    GLUCOSE 98 07/15/2025 07:59 AM    BUN 16 07/15/2025 07:59 AM    CREATININE 0.90 07/15/2025 07:59 AM    ASTSGOT 35 07/15/2025 07:59 AM    ALTSGPT 79 (H) 07/15/2025 07:59 AM    TBILIRUBIN 0.4 07/15/2025 07:59 AM    ALBUMIN 4.2 07/15/2025 07:59 AM    TOTPROTEIN 6.7 07/15/2025 07:59 AM    GLOBULIN 2.5 07/15/2025 07:59 AM    AGRATIO 1.7 07/15/2025 07:59 AM         Vitamin D:    Lab Results   Component Value Date/Time    25HYDROXY 25 (L) 07/15/2025 0759       GFR:    Lab Results   Component Value Date/Time    GFRCKD 107 07/15/2025 0759         ROS   Review of Systems  "  Constitutional: Negative.  Negative for fever, chills, weight loss, diaphoresis.   HENT: Negative.  Negative for hearing loss, ear pain, nosebleeds, congestion, sore throat, neck pain, tinnitus and ear discharge.    Respiratory: Negative.  Negative for cough, hemoptysis, sputum production, shortness of breath, wheezing and stridor.    Cardiovascular: Negative.  Negative for chest pain, palpitations, orthopnea, claudication, leg swelling and PND.   Gastrointestinal: denies nausea, vomiting, diarrhea, constipation, heartburn, melena or hematochezia.  Genitourinary: Denies dysuria, hematuria, urinary incontinence, frequency or urgency.    Musculoskeletal: Negative.  Negative for myalgias and back pain.   Neurological: Negative.  Negative for dizziness, tingling, tremors, weakness and headaches.   Psych:  Denies depression, anxiety or insomnia.  All other systems reviewed and are negative.       Objective:     /62   Pulse 77   Temp 36.1 °C (97 °F) (Temporal)   Ht 1.803 m (5' 11\")   Wt 105 kg (231 lb)   SpO2 95%  Body mass index is 32.22 kg/m².   Physical Exam  General: No acute distress.  Respiratory: Clear to auscultation, no wheezing, rales or rhonchi.  Gastrointestinal: Soft, no tenderness, no distention, no masses.  Extremities: Good pulses bilaterally in toes.  Physical Exam   Vitals reviewed.  Constitutional: oriented to person, place, and time. appears well-developed and well-nourished. No distress.   Neck: No JVD present.  Cardiovascular: Normal rate, regular rhythm, normal heart sounds and intact distal pulses.  Exam reveals no gallop and no friction rub.  No murmur heard.  No carotid bruits.   Pulmonary/Chest: Effort normal and breath sounds normal. No stridor. No respiratory distress. no wheezes or rales. exhibits no tenderness.   Musculoskeletal: Normal range of motion. exhibits no edema. kita pedal pulses 2+.  Lymphadenopathy: no cervical or supraclavicular adenopathy.   Neurological: alert and " oriented to person, place, and time. exhibits normal muscle tone. Coordination normal.   Skin: Skin is warm and dry. no diaphoresis.   Psychiatric: normal mood and affect. behavior is normal.       Assessment and Plan:   The following treatment plan was discussed    Assessment & Plan  1. Impaired fasting glucose.  A1c level has increased from 5.5 to 6.2 over the past 7 years, indicating prediabetes. Vital signs are normal; no medications needed for blood sugar management currently. Discussed the importance of diet and exercise to manage blood sugar levels. A1c will be rechecked in 6 months.    2. Hyperlipidemia.  Total cholesterol is slightly elevated; triglycerides are normal at 83. HDL is currently at the lower end of the normal range at 45. Advised to continue current diet and exercise routine to improve lipid profile. Goal is to reduce LDL to <100; lipid panel will be rechecked in 6 months.    3. Vitamin D deficiency.  Vitamin D level is currently at 25, below the normal range of . Prescribed ergocalciferol 50,000 units once a week for 12 weeks. Will take vitamin D3 2000 units daily for life after completing ergocalciferol.    4. Hepatic steatosis.  SGPT level is elevated at 79, suggesting fatty liver disease. Advised to lose weight to manage this condition. Liver function otherwise normal; no immediate medication required.    5. Hydrocele  and varicocele.  Moderate-sized fluid collection on the right and small fluid collection on the left; enlarged hydrocele on the left side. Referred to urology for further evaluation. No signs of torsion; advised to monitor for pain and follow up with urology.    6. Health maintenance.  PSA results are pending and will be communicated once available. Complete blood count and kidney function tests are normal. Ordered follow-up labs including chemistry panel, A1c, vitamin D, and lipid panel for 01/2026.    Follow-up: A1c, lipid panel, and vitamin D recheck in 6 months;  follow-up labs in 01/2026.      ORDERS:  1. IFG (impaired fasting glucose)    - Comp Metabolic Panel; Future  - HEMOGLOBIN A1C; Future    2. Hyperlipidemia LDL goal <100    - Comp Metabolic Panel; Future  - Lipid Profile; Future    3. Vitamin D deficiency (Primary)    - vitamin D2 (ERGOCALCIFEROL) 1.25 MG (62432 UT) Cap capsule; Take 1 Capsule by mouth every 7 days.  Dispense: 12 Capsule; Refill: 0  - VITAMIN D,25 HYDROXY (DEFICIENCY); Future        Please note that this dictation was created using voice recognition software. I have made every reasonable attempt to correct obvious errors, but I expect that there are errors of grammar and possibly content that I did not discover before finalizing the note.      Attestation      Verbal consent was acquired by the patient to use [employer] ambient listening note generation during this visit Yes                  [1]   Current Outpatient Medications   Medication Sig Dispense Refill    vitamin D2 (ERGOCALCIFEROL) 1.25 MG (72887 UT) Cap capsule Take 1 Capsule by mouth every 7 days. 12 Capsule 0    minoxidil (LONITEN) 2.5 MG Tab Take 2.5 mg by mouth every day.      acyclovir (ZOVIRAX) 400 MG tablet Take 1 Tablet by mouth 2 times a day as needed (hsv infection). 100 Tablet 0    Cholecalciferol (D3 VITAMIN PO) Take 1 capsule by mouth every day.      Acetaminophen (TYLENOL PO) Take 2 Tablets by mouth as needed (For pain). Pt is not sure the strength       No current facility-administered medications for this visit.   [2]   Current Outpatient Medications   Medication Sig Dispense Refill    vitamin D2 (ERGOCALCIFEROL) 1.25 MG (44977 UT) Cap capsule Take 1 Capsule by mouth every 7 days. 12 Capsule 0    minoxidil (LONITEN) 2.5 MG Tab Take 2.5 mg by mouth every day.      acyclovir (ZOVIRAX) 400 MG tablet Take 1 Tablet by mouth 2 times a day as needed (hsv infection). 100 Tablet 0    Cholecalciferol (D3 VITAMIN PO) Take 1 capsule by mouth every day.      Acetaminophen (TYLENOL PO)  Take 2 Tablets by mouth as needed (For pain). Pt is not sure the strength       No current facility-administered medications for this visit.

## 2025-07-16 NOTE — Clinical Note
REFERRAL APPROVAL NOTICE         Sent on July 16, 2025                   Maik Lopez  [address]  [place] [address]                   Dear Mr. Lopez,    After a careful review of the medical information and benefit coverage, [facility] has processed your referral. See below for additional details.    If applicable, you must be actively enrolled with your insurance for coverage of the authorized service. If you have any questions regarding your coverage, please contact your insurance directly.    REFERRAL INFORMATION   Referral #:  [id number]  Referred-To Department    Referred-By Provider:  Urology    [facility]      No address on file  Referring provider phone N/A [address]  [place] [address]  [phone]    Referral Start Date:  07/15/2025  Referral End Date:   07/15/2026             SCHEDULING  If you do not already have an appointment, please call [phone] to make an appointment.     MORE INFORMATION  If you do not already have a [facility] account, sign up at: [email or web address]  You can access your medical information, make appointments, see lab results, billing information, and more.  If you have questions regarding this referral, please contact  the [facility] Referrals department at:             [phone]. Monday - Friday 8:00AM - 5:00PM.     Sincerely,    [facility]

## 2025-07-17 LAB
PSA FREE MFR SERPL: 75 %
PSA FREE SERPL-MCNC: 0.3 NG/ML
PSA SERPL-MCNC: 0.4 NG/ML (ref 0–4)

## 2025-07-21 NOTE — Clinical Note
REFERRAL APPROVAL NOTICE         Sent on July 21, 2025                   Maik Arango John  [address] [place] [address]                   Dear Mr. Lopez,    After a careful review of the medical information and benefit coverage, [facility] has processed your referral. See below for additional details.    If applicable, you must be actively enrolled with your insurance for coverage of the authorized service. If you have any questions regarding your coverage, please contact your insurance directly.    REFERRAL INFORMATION   Referral #:  [id number]  Referred-To Provider    Referred-By Provider:  Gastroenterology    [facility]   [facility]      No address on file  Referring provider phone N/A [address] [place] [address]  [phone]    Referral Start Date:  07/15/2025  Referral End Date:   07/15/2026             SCHEDULING  If you do not already have an appointment, please call [phone] to make an appointment.     MORE INFORMATION  If you do not already have a [facility] account, sign up at: [email or web address]  You can access your medical information, make appointments, see lab results, billing information, and more.  If you have questions regarding this referral, please contact  the [facility] Referrals department at:             [phone]. Monday - Friday 8:00AM - 5:00PM.     Sincerely,    [facility]

## 2025-07-29 ENCOUNTER — OFFICE VISIT (OUTPATIENT)
Dept: UROLOGY | Facility: MEDICAL CENTER | Age: 45
End: 2025-07-29
Payer: COMMERCIAL

## 2025-07-29 DIAGNOSIS — Z30.09 VASECTOMY EVALUATION: ICD-10-CM

## 2025-07-29 DIAGNOSIS — N50.811 PAIN IN BOTH TESTICLES: Primary | ICD-10-CM

## 2025-07-29 DIAGNOSIS — N50.812 PAIN IN BOTH TESTICLES: Primary | ICD-10-CM

## 2025-07-29 RX ORDER — CELECOXIB 200 MG/1
200 CAPSULE ORAL 2 TIMES DAILY
Qty: 60 CAPSULE | Refills: 0 | Status: SHIPPED | OUTPATIENT
Start: 2025-07-29

## 2025-07-29 NOTE — PROGRESS NOTES
Chief Complaint: scrotal pain    HPI: Maik Lopez is a 45 y.o. male with a history of about 3 weeks of scrotal pain; this started on the right side and has lead to episodes of a sharp pain that refers up to the groin, and more recently has included a dull ache in the left testicle. He has had no hematuria, dysuria, UTI, or STI. No urethral discharge. Scrotal ultrasound demonstrated a small right hydrocele and a left varicocele.     He also had a bad episode of flu in June, but has been otherwise well. He started a new exercise regimen recently, but this was AFTER the onset of his pain.     He and his wife are also interested in a vasectomy. They had their second child 9 months ago and are confident they are done with family planning.     Past Medical History:  Past Medical History[1]    Past Surgical History:  Past Surgical History[2]    Family History:  Family History   Problem Relation Age of Onset    Non-contributory Mother         adopted    Cancer Father         skin cancer       Social History:  Social History     Socioeconomic History    Marital status:      Spouse name: Not on file    Number of children: Not on file    Years of education: Not on file    Highest education level: Not on file   Occupational History    Occupation: [employer]     Employer: [employer]     Comment:  too   Tobacco Use    Smoking status: Never    Smokeless tobacco: Never   Vaping Use    Vaping status: Never Used   Substance and Sexual Activity    Alcohol use: Yes     Alcohol/week: 3.0 oz     Types: 6 Cans of beer per week     Comment: occasional    Drug use: No    Sexual activity: Not on file   Other Topics Concern    Not on file   Social History Narrative    Not on file     Social Drivers of Health     Financial Resource Strain: Not on file   Food Insecurity: Not on file   Transportation Needs: Not on file   Physical Activity: Not on file   Stress: Not on file   Social Connections: Not on file   Intimate  Partner Violence: Not on file   Housing Stability: Not on file       Medications:  Current Medications[3]    Allergies:  Allergies[4]    Review of Systems:  Constitutional: Negative for fever, chills and malaise/fatigue.   HENT: Negative for congestion.    Eyes: Negative for pain.   Respiratory: Negative for cough and shortness of breath.    Cardiovascular: Negative for leg swelling.   Gastrointestinal: Negative for nausea, vomiting, abdominal pain and diarrhea.   Genitourinary: Negative for dysuria and hematuria.   Skin: Negative for rash.   Neurological: Negative for dizziness, focal weakness and headaches.   Endo/Heme/Allergies: Does not bruise/bleed easily.   Psychiatric/Behavioral: Negative for depression.  The patient is not nervous/anxious.        Physical Exam:  There were no vitals filed for this visit.    GENERAL: well appearing, well nourished, NAD  RESP: respiratory effort normal  ABDOMEN: soft, nontender, nondistended, no masses or organomegaly  HERNIAS: no hernias found on exam  SKIN/LYMPH: normal coloration and turgor, no suspicious skin lesions noted  NEURO/PSYCH: alert, oriented, normal speech, no focal findings or movement disorder noted  EXTREMITIES: no pedal edema noted  GENITOURINARY: normal male external genitalia, penis is normal, testes descended bilaterally, no testicular masses or scrotal swelling, and vas deferens palpable bilaterally  RECTAL: Exam Deferred    Data Review:    Labs:  POCT UA   Lab Results   Component Value Date/Time    POCCOLOR yellow 06/13/2013 11:25 AM    POCAPPEAR clear 06/13/2013 11:25 AM    POCLEUKEST neg 06/13/2013 11:25 AM    POCNITRITE neg 06/13/2013 11:25 AM    POCUROBILIGE neg 06/13/2013 11:25 AM    POCPROTEIN neg 06/13/2013 11:25 AM    POCURPH 6.0 06/13/2013 11:25 AM    POCBLOOD neg 06/13/2013 11:25 AM    POCSPGRV 1.005 06/13/2013 11:25 AM    POCKETONES neg 06/13/2013 11:25 AM    POCBILIRUBIN neg 06/13/2013 11:25 AM    POCGLUCUA neg 06/13/2013 11:25 AM      CBC    Lab Results   Component Value Date/Time    WBC 5.8 07/15/2025 0759    RBC 5.25 07/15/2025 0759    HEMOGLOBIN 16.1 07/15/2025 0759    HEMATOCRIT 47.3 07/15/2025 0759    MCV 90.1 07/15/2025 0759    MCH 30.7 07/15/2025 0759    MCHC 34.0 07/15/2025 0759    RDW 41.2 07/15/2025 0759    MPV 9.9 07/15/2025 0759    LYMPHOCYTES 22.80 07/15/2025 0759    LYMPHS 1.33 07/15/2025 0759    MONOCYTES 9.40 07/15/2025 0759    MONOS 0.55 07/15/2025 0759    EOSINOPHILS 1.20 07/15/2025 0759    EOS 0.07 07/15/2025 0759    BASOPHILS 0.70 07/15/2025 0759    BASO 0.04 07/15/2025 0759    NRBC 0.00 07/15/2025 0759       CMP   Lab Results   Component Value Date/Time    SODIUM 140 07/15/2025 0759    POTASSIUM 4.3 07/15/2025 0759    CHLORIDE 107 07/15/2025 0759    CO2 22 07/15/2025 0759    ANION 11.0 07/15/2025 0759    GLUCOSE 98 07/15/2025 0759    BUN 16 07/15/2025 0759    CREATININE 0.90 07/15/2025 0759    GFRCKD 107 07/15/2025 0759    CALCIUM 9.2 07/15/2025 0759    CORRCALC 9.0 07/15/2025 0759    ASTSGOT 35 07/15/2025 0759    ALTSGPT 79 (H) 07/15/2025 0759    ALKPHOSPHAT 79 07/15/2025 0759    TBILIRUBIN 0.4 07/15/2025 0759    ALBUMIN 4.2 07/15/2025 0759    TOTPROTEIN 6.7 07/15/2025 0759    GLOBULIN 2.5 07/15/2025 0759    AGRATIO 1.7 07/15/2025 0759       Imaging:   [id number]  Order: [id number]   Status: Final result       Next appt: Today at 09:30 AM in Urology (Sandor Jeong M.D.)       Dx: Scrotal pain    Test Result Released: Yes (seen)       Messages: Seen    1 Result Note       1 Patient Communication       View Follow-Up Encounter  Details    Reading Physician Reading Date Result Priority   Gerardo Swain M.D.  [phone] 7/15/2025      Narrative & Impression     7/15/2025 2:20 PM     HISTORY/REASON FOR EXAM: Bilateral scrotal pain.     TECHNIQUE/EXAM DESCRIPTION:  Real-time sonography of the scrotum was performed with gray-scale, color and duplex Doppler imaging.     COMPARISON: None     FINDINGS:     The right testis  measures 3.8 x 3.0 x 2.6 cm. Normal in size and echotexture. Normal vascularity on color Doppler. No intratesticular mass.     The left testis measures 4.3 x 2.0 x 2.8 cm. Normal in size and echotexture. Normal vascularity on color Doppler. No intratesticular mass.     Vascular flow is symmetric.     3 mm right epididymal head cyst.     Moderate right hydrocele is identified. Small left hydrocele.     Left-sided varicocele is identified.     IMPRESSION:     1.  No testicular mass or torsion.     2.  Moderate right hydrocele.     3.  Small left hydrocele.     4.  Left-sided varicocele.        Exam Ended: 07/15/25  3:09 PM Last Resulted: 07/15/25  3:38 PM         Assessment: 45 y.o. male with a history of 3 weeks of scrotal pain, with sharp pain on the right and dull on the left, with a normal physical exam (physiologic fluid around the testis, no significant hydrocele, and no palpable varicocele so grade 0). He had no known injuries prior to his pain, but did have a bout of flu, and given the small increase in fluid around the right testis I suspect he had a reactive hydrocele secondary to a viral orchitis. I explained that many viruses can cause orchitis and epididymitis, and the proximity of his flu infection makes this a likely explanation. We discussed conservative therapy for scrotal pain, including ice, elevation, and NSAIDS, and I recommended 2-4 weeks of use of celecoxib.     He is also interested in a vasectomy. We discussed the nature of the procedure of a vasectomy, and risks including bleeding, hematoma, vasal recanalization, unplanned pregnancy, wound infection, and scrotal/testicular pain. I explained that he must have at least one negative semen analysis prior to resuming sexual activity without another form of contraception. I also explained that a vasectomy does not protect against sexually transmitted diseases. Finally, I emphasized that a vasectomy is a permanent form of contraception, and though  there are procedures intended to reverse the effects of a vasectomy these are not always effective. Therefore, this should be considered an irreversible form of contraception. He understands and would like to proceed.         Plan:    -Celebrex 200 mg PO BID x 2-4 weeks  -Elevate/support scrotum  -Ice scrotum for 10 minutes 2-3 times daily  -RTC in 3 months; if pain resolved and repeat exam ok, we'll arrange for vasectomy       Sandor Jeong MD         [1]   Past Medical History:  Diagnosis Date    Attention deficit hyperactivity disorder (ADHD), combined type 06/01/2018    Chronic pain of left knee 01/13/2022    Esophagitis     EGD 2/09 done by Gael Nash MD - mild erosive esophagitis    Hemorrhoids     Insomnia     Obesity (BMI 30-39.9) 01/26/2017    Snoring 10/2010    apnea link wnl   [2]   Past Surgical History:  Procedure Laterality Date    CHOLECYSTECTOMY     [3]   Current Outpatient Medications   Medication Sig Dispense Refill    vitamin D2 (ERGOCALCIFEROL) 1.25 MG (93485 UT) Cap capsule Take 1 Capsule by mouth every 7 days. 12 Capsule 0    minoxidil (LONITEN) 2.5 MG Tab Take 2.5 mg by mouth every day.      acyclovir (ZOVIRAX) 400 MG tablet Take 1 Tablet by mouth 2 times a day as needed (hsv infection). 100 Tablet 0    Cholecalciferol (D3 VITAMIN PO) Take 1 capsule by mouth every day.      Acetaminophen (TYLENOL PO) Take 2 Tablets by mouth as needed (For pain). Pt is not sure the strength       No current facility-administered medications for this visit.   [4]   Allergies  Allergen Reactions    Nkda [No Known Drug Allergy]

## 2025-08-25 DIAGNOSIS — N50.811 PAIN IN BOTH TESTICLES: ICD-10-CM

## 2025-08-25 DIAGNOSIS — N50.812 PAIN IN BOTH TESTICLES: ICD-10-CM

## 2025-08-25 RX ORDER — CELECOXIB 200 MG/1
200 CAPSULE ORAL 2 TIMES DAILY
Qty: 60 CAPSULE | Refills: 0 | Status: SHIPPED | OUTPATIENT
Start: 2025-08-25